# Patient Record
Sex: MALE | Race: WHITE | NOT HISPANIC OR LATINO
[De-identification: names, ages, dates, MRNs, and addresses within clinical notes are randomized per-mention and may not be internally consistent; named-entity substitution may affect disease eponyms.]

---

## 2022-04-15 ENCOUNTER — APPOINTMENT (OUTPATIENT)
Dept: PAIN MANAGEMENT | Facility: CLINIC | Age: 72
End: 2022-04-15
Payer: OTHER MISCELLANEOUS

## 2022-04-15 VITALS — BODY MASS INDEX: 38.41 KG/M2 | HEIGHT: 66 IN | WEIGHT: 239 LBS

## 2022-04-15 PROCEDURE — 99213 OFFICE O/P EST LOW 20 MIN: CPT

## 2022-04-15 PROCEDURE — 99072 ADDL SUPL MATRL&STAF TM PHE: CPT

## 2022-04-15 NOTE — HISTORY OF PRESENT ILLNESS
[Neck] : neck [Lower back] : lower back [Dull/Aching] : dull/aching [Sharp] : sharp [Shooting] : shooting [] : yes [Frequent] : frequent [Household chores] : household chores [Social interactions] : social interactions [Rest] : rest [Meds] : meds [Walking] : walking [Bending forward] : bending forward [Exercising] : exercising [Stairs] : stairs [FreeTextEntry1] : shoulders, knees [de-identified] : lifting

## 2022-06-21 ENCOUNTER — APPOINTMENT (OUTPATIENT)
Dept: ORTHOPEDIC SURGERY | Facility: CLINIC | Age: 72
End: 2022-06-21
Payer: OTHER MISCELLANEOUS

## 2022-06-21 DIAGNOSIS — I63.9 CEREBRAL INFARCTION, UNSPECIFIED: ICD-10-CM

## 2022-06-21 PROCEDURE — 73562 X-RAY EXAM OF KNEE 3: CPT | Mod: 50

## 2022-06-21 PROCEDURE — 99215 OFFICE O/P EST HI 40 MIN: CPT

## 2022-06-21 PROCEDURE — 99072 ADDL SUPL MATRL&STAF TM PHE: CPT

## 2022-06-21 NOTE — DISCUSSION/SUMMARY
[de-identified] : The natural progression of Osteoarthritis was explained to the patient.  We discussed the possible treatment options from conservative to operative.  These included NSAIDS, Glucosamine and Chondrotin sulfate, and Physical Therapy as well different types of injections.  We also discussed that at some point they may progress to needed a TKA.  Information and pamphlets were given.\par \par We discussed my findings and the natural history of their condition.  We talked about the details of the proposed surgery and the recovery.  We discussed the material risks, possible benefits and alternatives to surgery.  The risks include but are not limited to infection, bleeding and possible need for blood transfusion, fracture, bowel blockage, bladder retention or infection, need for reoperation, stiffness and/or limited range of motion, possible damage to nerves and blood vessels, failure of fixation of components, risk of deep vein thromboses and pulmonary embolism, wound healing problems, dislocation, and possible leg length discrepancy.  Although incredibly rare, we also discussed the risks of a cardiac event, stroke and even death during, or following, the surgery.  We discussed the type of implants the patient will be receiving and the type of fixation that will be used, as well as whether a robot or computer navigation aide will be used.  The patient understands they will need medical clearance and will attend a preoperative joint education class.  We also discussed the type of anesthesia they will receive, and the risks associated with hospital or rehab length of stay, obesity, diabetes and smoking.\par \par Progress note completed by Shagufta Jarrett PA-C.\par The documentation recorded by the PA accurately reflects the service I personally performed and the decisions made by me. -Dr. Siddiqui\par

## 2022-06-21 NOTE — IMAGING
[de-identified] : Right Knee: Palpation of the knee is as follows: medial joint line tenderness. Knee Range of Motion is as follows: Flexion: 120 degrees. Gait and function is as follows: mildly antalgic gait. \par \par Left Knee: Inspection of the knee is as follows: mild effusion. Palpation of the knee is as follows: medial joint line tenderness. Knee Range of Motion is as follows: Flexion: 120 degrees. Gait and function is as follows: ambulation with cane.

## 2022-06-21 NOTE — ASSESSMENT
[FreeTextEntry1] : Previous doc:\par Mod/adv OA left knee, prior tib plateau fx. Progression on degen on XR over the psat 3 years. Cortisone inj today and work on weight loss to get BMI < 40.\par 6/21/19: Continue activities as tolerated and HEP. He has to get vertigo under control. Will consider cortisone at next appt.\par 9/13/19: Cont pain in left knee - tolerating it with home exercises. Working on weight loss and vertigo control. Uses cane for balance/vertigo.\par 12/13 knee is stable no sig changes - HEP - uising cane for balance - inj left shoulder today did help him in the past\par 4/22/20:Steroid inj helped in the past but want to wait until this passes but stable rights now - Vertigo is stable with occ episodes - Left shoulder inj helped a lot rarely has pain\par 6/2/20: Mult falls secondary to episodes of vertigo. Not much pain today so will hold on injections. Concerned about his ongoing vertigo causing mult falls and leading to further injury - needs to keep following this with PCP although he has been told it will get progressively worse over time and has no good short term solution. Had neg neuro and ENT eval in the past. Left shoulder repeat bursa inj today. - has been falling a lot due to knee and balance - has rib contusion and I feel he will benefit from lidocaine patches\par 9/4/20: Overall no change - tolerating pain well but has episodes of flareups after falls from vertigo. Discussed inj when pain is severe but for now will cont home exercises.\par 12/18/20: Stable since last visit. Seems do be doing ok with home exercises so will cont this for now and reeval in 3 months.\par 8/23/21: Hematoma throughout right calf - no signs of DVT. Recc cont with ice and elevation and restart PT when able to work on quad strength which was likely cause for his knees giving out.\par 10/1 swelling and ecchimosis resolving but still some knee pain - unchanged and stable - shoulder is stable since the injection - he is currently permanent out of work\par 3/29/22: Has falls every so often due to vertigo. Knees have pain when these episodes occur however today he is tolerating the pain well and declined injections.\par \par 6/21/22: Discussed TKA, r/b/a, and preop/postop periods in detail. LEft knee worse than the rt knee - planning for TKA in the fall - we will plan robotics get Knee CT scan to eval bone loss

## 2022-06-21 NOTE — HISTORY OF PRESENT ILLNESS
[Work related] : work related [Sudden] : sudden [7] : 7 [Dull/Aching] : dull/aching [Intermittent] : intermittent [Meds] : meds [Physical therapy] : physical therapy [Walking] : walking [Exercising] : exercising [de-identified] : 6/21/22: He returns with increasing pain in bilateral knees with increasing feeling of instability in his L knee. Pain is now affecting his ADLs\par \par Previous doc:\par Left knee injury in 2016 sustained lateral tibial plateau fx treated nonsurgically. Prior to that had arthroscopy 2008. Worsening pain over the past 3 years. PT in the past helped a little but had difficulty with this due to vertigo. Cortisone inj in the past helped. CVA 2017 now takes aspirin - weakness only in right pinky finger.\par 6/21/19: Symptoms remain the same. Injection helped temporary. Has good and bad days with knee depending on severity of vertigo.\par 9/13/19: Cont pain in left knee, no change overall.\par 12/13/19: Cont pain in knees but tolerable. Does home exercises. Left shoulder worsening x 3 months which is part of his WC case. Had inj in Feb which helped for several months - MRI in the past with partial cuff tearing. Pain wakes him up at night.\par 4/22/20: he has reji doing min due to COVId he is in some pain but tolerable\par 6/2/20: Mult falls from vertigo recently. Has knee pain only at the time of the fall and then resolves. Min pain today.\par 9/4/20: Had good relief from shoulder inj last time. Mult falls with vertigo, no change overall. Pain is daily but tolerable at this time.\par 12/18/20: No significant change since last time. has been doing home exercises which help.\par 8/23/21: Legs gave out while on the beach, fell and has pain and swelling in right leg. Seen at urgent care last week, had neg XRs.\par 10/1 overall most of the calf pain as swelling has resolved\par 3/29/22: Continued pain in b/l knees. Had another fall last month due to vertigo. [] : no [FreeTextEntry1] : bilateral knees  [FreeTextEntry3] : 01/16/2001 [FreeTextEntry5] : pt reports left>right knee pain. pain is exacerbated today due to exercising yesterday. [FreeTextEntry7] : knees to feet

## 2022-06-22 ENCOUNTER — FORM ENCOUNTER (OUTPATIENT)
Age: 72
End: 2022-06-22

## 2022-06-23 ENCOUNTER — APPOINTMENT (OUTPATIENT)
Dept: CT IMAGING | Facility: CLINIC | Age: 72
End: 2022-06-23
Payer: OTHER MISCELLANEOUS

## 2022-06-23 PROCEDURE — 73700 CT LOWER EXTREMITY W/O DYE: CPT | Mod: LT

## 2022-06-23 PROCEDURE — 99072 ADDL SUPL MATRL&STAF TM PHE: CPT

## 2022-06-23 PROCEDURE — 76376 3D RENDER W/INTRP POSTPROCES: CPT | Mod: LT

## 2022-07-12 ENCOUNTER — RX RENEWAL (OUTPATIENT)
Age: 72
End: 2022-07-12

## 2022-07-14 ENCOUNTER — APPOINTMENT (OUTPATIENT)
Dept: PAIN MANAGEMENT | Facility: CLINIC | Age: 72
End: 2022-07-14

## 2022-07-14 VITALS — HEIGHT: 66 IN | WEIGHT: 243 LBS | BODY MASS INDEX: 39.05 KG/M2

## 2022-07-14 PROCEDURE — 99213 OFFICE O/P EST LOW 20 MIN: CPT

## 2022-07-14 PROCEDURE — 99072 ADDL SUPL MATRL&STAF TM PHE: CPT

## 2022-07-14 NOTE — HISTORY OF PRESENT ILLNESS
[Neck] : neck [Lower back] : lower back [Dull/Aching] : dull/aching [Sharp] : sharp [Shooting] : shooting [Frequent] : frequent [Household chores] : household chores [Social interactions] : social interactions [Rest] : rest [Meds] : meds [Walking] : walking [Bending forward] : bending forward [Exercising] : exercising [Stairs] : stairs [] : yes [FreeTextEntry1] : shoulders, knees [de-identified] : lifting

## 2022-07-14 NOTE — ASSESSMENT
[FreeTextEntry1] : refill meds\par pain is stable, will continue to monitor f.u 3 months \par \par planning knee replacement L with Dr Siddiqui

## 2022-07-14 NOTE — HISTORY OF PRESENT ILLNESS
[Neck] : neck [Lower back] : lower back [Dull/Aching] : dull/aching [Sharp] : sharp [Shooting] : shooting [Frequent] : frequent [Household chores] : household chores [Social interactions] : social interactions [Rest] : rest [Meds] : meds [Walking] : walking [Bending forward] : bending forward [Exercising] : exercising [Stairs] : stairs [] : yes [FreeTextEntry1] : shoulders, knees [de-identified] : lifting

## 2022-07-19 ENCOUNTER — APPOINTMENT (OUTPATIENT)
Dept: ORTHOPEDIC SURGERY | Facility: CLINIC | Age: 72
End: 2022-07-19

## 2022-07-19 VITALS — BODY MASS INDEX: 22.5 KG/M2 | HEIGHT: 66 IN | WEIGHT: 140 LBS

## 2022-07-19 PROCEDURE — 99072 ADDL SUPL MATRL&STAF TM PHE: CPT

## 2022-07-19 PROCEDURE — 99213 OFFICE O/P EST LOW 20 MIN: CPT | Mod: PA

## 2022-07-19 NOTE — DISCUSSION/SUMMARY
[de-identified] : We discussed my findings and the natural history of their condition.  We talked about the details of the proposed surgery and the recovery.  We discussed the material risks, possible benefits and alternatives to surgery.  The risks include but are not limited to infection, bleeding and possible need for blood transfusion, fracture, bowel blockage, bladder retention or infection, need for reoperation, stiffness and/or limited range of motion, possible damage to nerves and blood vessels, failure of fixation of components, risk of deep vein thromboses and pulmonary embolism, wound healing problems, dislocation, and possible leg length discrepancy.  Although incredibly rare, we also discussed the risks of a cardiac event, stroke and even death during, or following, the surgery.  We discussed the type of implants the patient will be receiving and the type of fixation that will be used, as well as whether a robot or computer navigation aide will be used.  The patient understands they will need medical clearance and will attend a preoperative joint education class.  We also discussed the type of anesthesia they will receive, and the risks associated with hospital or rehab length of stay, obesity, diabetes and smoking.\par \par The natural progression of Osteoarthritis was explained to the patient.  We discussed the possible treatment options from conservative to operative.  These included NSAIDS, Glucosamine and Chondrotin sulfate, Physical Therapy and injections.  We also discussed that at some point they may progress to needing a TKA.\par

## 2022-07-19 NOTE — IMAGING
[de-identified] : Right Knee: Palpation of the knee is as follows: medial joint line tenderness. Knee Range of Motion is as follows: Flexion: 120 degrees. Gait and function is as follows: mildly antalgic gait. \par \par Left Knee: Inspection of the knee is as follows: mild effusion. Palpation of the knee is as follows: medial joint line tenderness. Knee Range of Motion is as follows: Flexion: 120 degrees. Gait and function is as follows: ambulation with cane.

## 2022-07-19 NOTE — ASSESSMENT
[FreeTextEntry1] : Previous doc:\par Mod/adv OA left knee, prior tib plateau fx. Progression on degen on XR over the psat 3 years. Cortisone inj today and work on weight loss to get BMI < 40.\par 6/21/19: Continue activities as tolerated and HEP. He has to get vertigo under control. Will consider cortisone at next appt.\par 9/13/19: Cont pain in left knee - tolerating it with home exercises. Working on weight loss and vertigo control. Uses cane for balance/vertigo.\par 12/13 knee is stable no sig changes - HEP - uising cane for balance - inj left shoulder today did help him in the past\par 4/22/20:Steroid inj helped in the past but want to wait until this passes but stable rights now - Vertigo is stable with occ episodes - Left shoulder inj helped a lot rarely has pain\par 6/2/20: Mult falls secondary to episodes of vertigo. Not much pain today so will hold on injections. Concerned about his ongoing vertigo causing mult falls and leading to further injury - needs to keep following this with PCP although he has been told it will get progressively worse over time and has no good short term solution. Had neg neuro and ENT eval in the past. Left shoulder repeat bursa inj today. - has been falling a lot due to knee and balance - has rib contusion and I feel he will benefit from lidocaine patches\par 9/4/20: Overall no change - tolerating pain well but has episodes of flareups after falls from vertigo. Discussed inj when pain is severe but for now will cont home exercises.\par 12/18/20: Stable since last visit. Seems do be doing ok with home exercises so will cont this for now and reeval in 3 months.\par 8/23/21: Hematoma throughout right calf - no signs of DVT. Recc cont with ice and elevation and restart PT when ab/le to work on quad strength which was likely cause for his knees giving out.\par 10/1 swelling and ecchimosis resolving but still some knee pain - unchanged and stable - shoulder is stable since the injection - he is currently permanent out of work\par \par 7/19/22: \par 3/29/22: Has falls every so often due to vertigo. Knees have pain when these episodes occur however today he is tolerating the pain well and declined injections.\par 6/21/22: Discussed TKA, r/b/a, and preop/postop periods in detail. LEft knee worse than the rt knee - planning for TKA in the fall - we will plan robotics get Knee CT scan to eval bone loss   \par \par 7/19/22: Cont pain, proceed with TKA when authorized.  CT was done.

## 2022-07-19 NOTE — HISTORY OF PRESENT ILLNESS
[8] : 8 [4] : 4 [Dull/Aching] : dull/aching [] : yes [de-identified] : 7/19/22: Cont pain, waiting for TKA auth.\par \par Previous doc:\par Left knee injury in 2016 sustained lateral tibial plateau fx treated nonsurgically. Prior to that had arthroscopy 2008. Worsening pain over the past 3 years. PT in the past helped a little but had difficulty with this due to vertigo. Cortisone inj in the past helped. CVA 2017 now takes aspirin - weakness only in right pinky finger.\par 6/21/19: Symptoms remain the same. Injection helped temporary. Has good and bad days with knee depending on severity of vertigo.\par 9/13/19: Cont pain in left knee, no change overall.\par 12/13/19: Cont pain in knees but tolerable. Does home exercises. Left shoulder worsening x 3 months which is part of his WC case. Had inj in Feb which helped for several months - MRI in the past with partial cuff tearing. Pain wakes him up at night.\par 4/22/20: he has reji doing min due to COVId he is in some pain but tolerable\par 6/2/20: Mult falls from vertigo recently. Has knee pain only at the time of the fall and then resolves. Min pain today.\par 9/4/20: Had good relief from shoulder inj last time. Mult falls with vertigo, no change overall. Pain is daily but tolerable at this time.\par 12/18/20: No significant change since last time. has been doing home exercises which help.\par 8/23/21: Legs gave out while on the beach, fell and has pain and swelling in right leg. Seen at urgent care last week, had neg XRs.\par 10/1 overall most of the calf pain as swelling has resolved\par 3/29/22: Continued pain in b/l knees. Had another fall last month due to vertigo.\par 6/21/22: He returns with increasing pain in bilateral knees with increasing feeling of instability in his L knee. Pain is now affecting his ADLs

## 2022-07-19 NOTE — HISTORY OF PRESENT ILLNESS
[Neck] : neck [Lower back] : lower back [Dull/Aching] : dull/aching [Sharp] : sharp [Shooting] : shooting [Frequent] : frequent [Household chores] : household chores [Social interactions] : social interactions [Rest] : rest [Meds] : meds [Walking] : walking [Bending forward] : bending forward [Exercising] : exercising [] : yes [de-identified] : lifting

## 2022-08-15 ENCOUNTER — RX RENEWAL (OUTPATIENT)
Age: 72
End: 2022-08-15

## 2022-10-11 ENCOUNTER — RX RENEWAL (OUTPATIENT)
Age: 72
End: 2022-10-11

## 2022-10-20 ENCOUNTER — APPOINTMENT (OUTPATIENT)
Dept: PAIN MANAGEMENT | Facility: CLINIC | Age: 72
End: 2022-10-20
Payer: OTHER MISCELLANEOUS

## 2022-10-20 VITALS — BODY MASS INDEX: 38.25 KG/M2 | HEIGHT: 66 IN | WEIGHT: 238 LBS

## 2022-10-20 PROCEDURE — 99213 OFFICE O/P EST LOW 20 MIN: CPT

## 2022-10-20 PROCEDURE — 99072 ADDL SUPL MATRL&STAF TM PHE: CPT

## 2022-10-20 NOTE — HISTORY OF PRESENT ILLNESS
[Lower back] : lower back [Dull/Aching] : dull/aching [Sharp] : sharp [Shooting] : shooting [Frequent] : frequent [Household chores] : household chores [Social interactions] : social interactions [Rest] : rest [Meds] : meds [Walking] : walking [Bending forward] : bending forward [Exercising] : exercising [Stairs] : stairs [] : yes [Neck] : neck [Work related] : work related [8] : 8 [7] : 7 [FreeTextEntry1] : shoulders, knees [FreeTextEntry3] : 1/16/01 [FreeTextEntry7] : left ankle/knees [de-identified] : lifting

## 2022-10-25 ENCOUNTER — APPOINTMENT (OUTPATIENT)
Dept: ORTHOPEDIC SURGERY | Facility: CLINIC | Age: 72
End: 2022-10-25

## 2022-10-25 VITALS — WEIGHT: 238 LBS | HEIGHT: 66 IN | BODY MASS INDEX: 38.25 KG/M2

## 2022-10-25 PROCEDURE — 99072 ADDL SUPL MATRL&STAF TM PHE: CPT

## 2022-10-25 PROCEDURE — 99213 OFFICE O/P EST LOW 20 MIN: CPT

## 2022-10-25 NOTE — IMAGING
[de-identified] : Right Knee: Palpation of the knee is as follows: medial joint line tenderness. Knee Range of Motion is as follows: Flexion: 120 degrees. Gait and function is as follows: mildly antalgic gait. \par \par Left Knee: Inspection of the knee is as follows: mild effusion. Palpation of the knee is as follows: medial joint line tenderness. Knee Range of Motion is as follows: Flexion: 120 degrees. Gait and function is as follows: ambulation with cane.

## 2022-10-25 NOTE — ASSESSMENT
[FreeTextEntry1] : Previous doc:\par Mod/adv OA left knee, prior tib plateau fx. Progression on degen on XR over the psat 3 years. Cortisone inj today and work on weight loss to get BMI < 40.\par 6/21/19: Continue activities as tolerated and HEP. He has to get vertigo under control. Will consider cortisone at next appt.\par 9/13/19: Cont pain in left knee - tolerating it with home exercises. Working on weight loss and vertigo control. Uses cane for balance/vertigo.\par 12/13 knee is stable no sig changes - HEP - uising cane for balance - inj left shoulder today did help him in the past\par 4/22/20:Steroid inj helped in the past but want to wait until this passes but stable rights now - Vertigo is stable with occ episodes - Left shoulder inj helped a lot rarely has pain\par 6/2/20: Mult falls secondary to episodes of vertigo. Not much pain today so will hold on injections. Concerned about his ongoing vertigo causing mult falls and leading to further injury - needs to keep following this with PCP although he has been told it will get progressively worse over time and has no good short term solution. Had neg neuro and ENT eval in the past. Left shoulder repeat bursa inj today. - has been falling a lot due to knee and balance - has rib contusion and I feel he will benefit from lidocaine patches\par 9/4/20: Overall no change - tolerating pain well but has episodes of flareups after falls from vertigo. Discussed inj when pain is severe but for now will cont home exercises.\par 12/18/20: Stable since last visit. Seems do be doing ok with home exercises so will cont this for now and reeval in 3 months.\par 8/23/21: Hematoma throughout right calf - no signs of DVT. Recc cont with ice and elevation and restart PT when ab/le to work on quad strength which was likely cause for his knees giving out.\par 10/1 swelling and ecchimosis resolving but still some knee pain - unchanged and stable - shoulder is stable since the injection - he is currently permanent out of work\par 7/19/22: \par 3/29/22: Has falls every so often due to vertigo. Knees have pain when these episodes occur however today he is tolerating the pain well and declined injections.\par 6/21/22: Discussed TKA, r/b/a, and preop/postop periods in detail. LEft knee worse than the rt knee - planning for TKA in the fall - we will plan robotics get Knee CT scan to eval bone loss   \par 7/19/22: Cont pain, proceed with TKA when authorized.  CT was done.\par \par 10/25/22: still awaiting WC auth as pain is worsening as well as worsening right knee pain. FU in 4 weeks. plan to speak with  and EC department

## 2022-10-25 NOTE — DISCUSSION/SUMMARY
[de-identified] : We discussed my findings and the natural history of their condition.  We talked about the details of the proposed surgery and the recovery.  We discussed the material risks, possible benefits and alternatives to surgery.  The risks include but are not limited to infection, bleeding and possible need for blood transfusion, fracture, bowel blockage, bladder retention or infection, need for reoperation, stiffness and/or limited range of motion, possible damage to nerves and blood vessels, failure of fixation of components, risk of deep vein thromboses and pulmonary embolism, wound healing problems, dislocation, and possible leg length discrepancy.  Although incredibly rare, we also discussed the risks of a cardiac event, stroke and even death during, or following, the surgery.  We discussed the type of implants the patient will be receiving and the type of fixation that will be used, as well as whether a robot or computer navigation aide will be used.  The patient understands they will need medical clearance and will attend a preoperative joint education class.  We also discussed the type of anesthesia they will receive, and the risks associated with hospital or rehab length of stay, obesity, diabetes and smoking.\par \par The natural progression of Osteoarthritis was explained to the patient.  We discussed the possible treatment options from conservative to operative.  These included NSAIDS, Glucosamine and Chondrotin sulfate, Physical Therapy and injections.  We also discussed that at some point they may progress to needing a TKA.\par

## 2022-10-25 NOTE — HISTORY OF PRESENT ILLNESS
[Work related] : work related [8] : 8 [6] : 6 [Dull/Aching] : dull/aching [Constant] : constant [Household chores] : household chores [Leisure] : leisure [Meds] : meds [Ice] : ice [Standing] : standing [Walking] : walking [de-identified] : 10/25/22: continued pain in knees - is waiting for a surgery auth\par \par Previous doc:\par Left knee injury in 2016 sustained lateral tibial plateau fx treated nonsurgically. Prior to that had arthroscopy 2008. Worsening pain over the past 3 years. PT in the past helped a little but had difficulty with this due to vertigo. Cortisone inj in the past helped. CVA 2017 now takes aspirin - weakness only in right pinky finger.\par 6/21/19: Symptoms remain the same. Injection helped temporary. Has good and bad days with knee depending on severity of vertigo.\par 9/13/19: Cont pain in left knee, no change overall.\par 12/13/19: Cont pain in knees but tolerable. Does home exercises. Left shoulder worsening x 3 months which is part of his WC case. Had inj in Feb which helped for several months - MRI in the past with partial cuff tearing. Pain wakes him up at night.\par 4/22/20: he has reji doing min due to COVId he is in some pain but tolerable\par 6/2/20: Mult falls from vertigo recently. Has knee pain only at the time of the fall and then resolves. Min pain today.\par 9/4/20: Had good relief from shoulder inj last time. Mult falls with vertigo, no change overall. Pain is daily but tolerable at this time.\par 12/18/20: No significant change since last time. has been doing home exercises which help.\par 8/23/21: Legs gave out while on the beach, fell and has pain and swelling in right leg. Seen at urgent care last week, had neg XRs.\par 10/1 overall most of the calf pain as swelling has resolved\par 3/29/22: Continued pain in b/l knees. Had another fall last month due to vertigo.\par 6/21/22: He returns with increasing pain in bilateral knees with increasing feeling of instability in his L knee. Pain is now affecting his ADLs\par 7/19/22: Cont pain, waiting for TKA auth. [] : no [FreeTextEntry1] : left knee [FreeTextEntry3] : 01/16/01 [FreeTextEntry5] : Pt is here to follow up on the work related injury to the left knee. Pt states pain has worsened since last visit. Pt states physical therapy assisted pain, but still feels extreme pain. Pt states going down an incline, or bending over is when pain is most prevalent. Pt states he does leg exercises at home.

## 2022-11-07 ENCOUNTER — RX RENEWAL (OUTPATIENT)
Age: 72
End: 2022-11-07

## 2022-11-22 ENCOUNTER — APPOINTMENT (OUTPATIENT)
Dept: ORTHOPEDIC SURGERY | Facility: CLINIC | Age: 72
End: 2022-11-22

## 2022-11-22 VITALS — WEIGHT: 238 LBS | HEIGHT: 66 IN | BODY MASS INDEX: 38.25 KG/M2

## 2022-11-22 PROCEDURE — 99213 OFFICE O/P EST LOW 20 MIN: CPT

## 2022-11-22 PROCEDURE — 99072 ADDL SUPL MATRL&STAF TM PHE: CPT

## 2022-11-22 NOTE — ASSESSMENT
[FreeTextEntry1] : Previous doc:\par Mod/adv OA left knee, prior tib plateau fx. Progression on degen on XR over the psat 3 years. Cortisone inj today and work on weight loss to get BMI < 40.\par 6/21/19: Continue activities as tolerated and HEP. He has to get vertigo under control. Will consider cortisone at next appt.\par 9/13/19: Cont pain in left knee - tolerating it with home exercises. Working on weight loss and vertigo control. Uses cane for balance/vertigo.\par 12/13 knee is stable no sig changes - HEP - uising cane for balance - inj left shoulder today did help him in the past\par 4/22/20:Steroid inj helped in the past but want to wait until this passes but stable rights now - Vertigo is stable with occ episodes - Left shoulder inj helped a lot rarely has pain\par 6/2/20: Mult falls secondary to episodes of vertigo. Not much pain today so will hold on injections. Concerned about his ongoing vertigo causing mult falls and leading to further injury - needs to keep following this with PCP although he has been told it will get progressively worse over time and has no good short term solution. Had neg neuro and ENT eval in the past. Left shoulder repeat bursa inj today. - has been falling a lot due to knee and balance - has rib contusion and I feel he will benefit from lidocaine patches\par 9/4/20: Overall no change - tolerating pain well but has episodes of flareups after falls from vertigo. Discussed inj when pain is severe but for now will cont home exercises.\par 12/18/20: Stable since last visit. Seems do be doing ok with home exercises so will cont this for now and reeval in 3 months.\par 8/23/21: Hematoma throughout right calf - no signs of DVT. Recc cont with ice and elevation and restart PT when ab/le to work on quad strength which was likely cause for his knees giving out.\par 10/1 swelling and ecchimosis resolving but still some knee pain - unchanged and stable - shoulder is stable since the injection - he is currently permanent out of work\par 7/19/22: \par 3/29/22: Has falls every so often due to vertigo. Knees have pain when these episodes occur however today he is tolerating the pain well and declined injections.\par 6/21/22: Discussed TKA, r/b/a, and preop/postop periods in detail. LEft knee worse than the rt knee - planning for TKA in the fall - we will plan robotics get Knee CT scan to eval bone loss   \par 7/19/22: Cont pain, proceed with TKA when authorized.  CT was done.\par 10/25/22: still awaiting WC auth as pain is worsening as well as worsening right knee pain. FU in 4 weeks. plan to speak with  and EC department \par \par 11/22/22: Worsening pain in left knee. He is still pending surgical auth. again review surgery and post op coarse

## 2022-11-22 NOTE — DISCUSSION/SUMMARY
[de-identified] : The natural progression of Osteoarthritis was explained to the patient.  We discussed the possible treatment options from conservative to operative.  These included NSAIDS, Glucosamine and Chondrotin sulfate, and Physical Therapy as well different types of injections.  We also discussed that at some point they may progress to needed a TKA.  Information and pamphlets were given when appropriate.\par \par Patient Complains of pain in Knee with a level that often reaches greater than a 8/10. The Pain has been progressively worsening of his/her treatment coarse. The pain has interfered with their ADLs and worsens with weight bearing. On exam they often have episodes of swelling/effusion with limited ROM. Pain worsens with ROM passive and active and I can palpate crepitus.\par  X rays were reviewed with the patient and they show joint space narrowing, subchondral sclerosis, osteophyte formation, and subchondral cysts.\par  After a period of more than 12 weeks physical therapy or exercise program done with me or another treating physician they have continued pain. The patient has failed a trial of NSAID medication or pain relieves if they were unable to tolerate NSAID medications as well as a series of injection, steroid or Hyaluronic Acid. After a long discussion with the patient both the patient and I have decided we have exhausted all forms of less radical treatments and they would like to proceed with Total Knee Replacement\par \par We discussed my findings and the natural history of their condition.  We talked about the details of the proposed surgery and the recovery.  We discussed the material risks, possible benefits and alternatives to surgery.  The risks include but are not limited to infection, bleeding and possible need for blood transfusion, fracture, bowel blockage, bladder retention or infection, need for reoperation, stiffness and/or limited range of motion, possible damage to nerves and blood vessels, failure of fixation of components, risk of deep vein thromboses and pulmonary embolism, wound healing problems, dislocation, and possible leg length discrepancy.  Although incredibly rare, we also discussed the risks of a cardiac event, stroke and even death during, or following, the surgery.  We discussed the type of implants the patient will be receiving and the type of fixation that will be used, as well as whether a robot or computer navigation aide will be used.  The patient understands they will need medical clearance and will attend a preoperative joint education class.  We also discussed the type of anesthesia they will receive, and the risks associated with hospital or rehab length of stay, obesity, diabetes and smoking.\par \par

## 2022-11-22 NOTE — HISTORY OF PRESENT ILLNESS
[Work related] : work related [8] : 8 [5] : 5 [Dull/Aching] : dull/aching [Radiating] : radiating [] : yes [de-identified] : 11/22/22: Worsening pain in knee. He is still pending surgical auth.   \par \par Previous doc:\par Left knee injury in 2016 sustained lateral tibial plateau fx treated nonsurgically. Prior to that had arthroscopy 2008. Worsening pain over the past 3 years. PT in the past helped a little but had difficulty with this due to vertigo. Cortisone inj in the past helped. CVA 2017 now takes aspirin - weakness only in right pinky finger.\par 6/21/19: Symptoms remain the same. Injection helped temporary. Has good and bad days with knee depending on severity of vertigo.\par 9/13/19: Cont pain in left knee, no change overall.\par 12/13/19: Cont pain in knees but tolerable. Does home exercises. Left shoulder worsening x 3 months which is part of his WC case. Had inj in Feb which helped for several months - MRI in the past with partial cuff tearing. Pain wakes him up at night.\par 4/22/20: he has reji doing min due to COVId he is in some pain but tolerable\par 6/2/20: Mult falls from vertigo recently. Has knee pain only at the time of the fall and then resolves. Min pain today.\par 9/4/20: Had good relief from shoulder inj last time. Mult falls with vertigo, no change overall. Pain is daily but tolerable at this time.\par 12/18/20: No significant change since last time. has been doing home exercises which help.\par 8/23/21: Legs gave out while [place] the beach, fell and has pain and swelling in right leg. Seen at urgent care last week, had neg XRs.\par 10/1 overall most of the calf pain as swelling has resolved\par 3/29/22: Continued pain in b/l knees. Had another fall last month due to vertigo.\par 6/21/22: He returns with increasing pain in bilateral knees with increasing feeling of instability in his L knee. Pain is now affecting his ADLs\par 7/19/22: Cont pain, waiting for TKA auth.\par 10/25/22: continued pain in knees - is waiting for a surgery auth [FreeTextEntry3] : 1/16/2001 [FreeTextEntry5] : pain getting worse

## 2022-11-22 NOTE — IMAGING
[de-identified] : Right Knee: Palpation of the knee is as follows: medial joint line tenderness. Knee Range of Motion is as follows: Flexion: 120 degrees. Gait and function is as follows: mildly antalgic gait. \par \par Left Knee: Inspection of the knee is as follows: mild effusion. Palpation of the knee is as follows: medial joint line tenderness. Knee Range of Motion is as follows: Flexion: 120 degrees. Gait and function is as follows: ambulation with cane.

## 2022-12-05 ENCOUNTER — RX RENEWAL (OUTPATIENT)
Age: 72
End: 2022-12-05

## 2023-01-02 ENCOUNTER — RX RENEWAL (OUTPATIENT)
Age: 73
End: 2023-01-02

## 2023-01-09 ENCOUNTER — FORM ENCOUNTER (OUTPATIENT)
Age: 73
End: 2023-01-09

## 2023-01-12 ENCOUNTER — APPOINTMENT (OUTPATIENT)
Dept: PAIN MANAGEMENT | Facility: CLINIC | Age: 73
End: 2023-01-12
Payer: OTHER MISCELLANEOUS

## 2023-01-12 VITALS — BODY MASS INDEX: 37.93 KG/M2 | HEIGHT: 66 IN | WEIGHT: 236 LBS

## 2023-01-12 PROCEDURE — 99072 ADDL SUPL MATRL&STAF TM PHE: CPT

## 2023-01-12 PROCEDURE — 99214 OFFICE O/P EST MOD 30 MIN: CPT

## 2023-01-12 NOTE — HISTORY OF PRESENT ILLNESS
[Neck] : neck [Lower back] : lower back [Work related] : work related [8] : 8 [7] : 7 [Dull/Aching] : dull/aching [Sharp] : sharp [Shooting] : shooting [Frequent] : frequent [Household chores] : household chores [Social interactions] : social interactions [Rest] : rest [Meds] : meds [Walking] : walking [Bending forward] : bending forward [Exercising] : exercising [Stairs] : stairs [] : yes [FreeTextEntry1] : shoulders, knees [FreeTextEntry3] : 1/16/01 [FreeTextEntry7] : left ankle/knees [de-identified] : lifting

## 2023-01-18 ENCOUNTER — RX RENEWAL (OUTPATIENT)
Age: 73
End: 2023-01-18

## 2023-01-26 ENCOUNTER — OUTPATIENT (OUTPATIENT)
Dept: OUTPATIENT SERVICES | Facility: HOSPITAL | Age: 73
LOS: 1 days | Discharge: HOME | End: 2023-01-26
Payer: OTHER MISCELLANEOUS

## 2023-01-26 VITALS
HEART RATE: 60 BPM | TEMPERATURE: 98 F | RESPIRATION RATE: 17 BRPM | SYSTOLIC BLOOD PRESSURE: 126 MMHG | WEIGHT: 238.32 LBS | DIASTOLIC BLOOD PRESSURE: 85 MMHG | OXYGEN SATURATION: 97 %

## 2023-01-26 DIAGNOSIS — M17.12 UNILATERAL PRIMARY OSTEOARTHRITIS, LEFT KNEE: ICD-10-CM

## 2023-01-26 DIAGNOSIS — Z01.818 ENCOUNTER FOR OTHER PREPROCEDURAL EXAMINATION: ICD-10-CM

## 2023-01-26 DIAGNOSIS — H40.9 UNSPECIFIED GLAUCOMA: ICD-10-CM

## 2023-01-26 DIAGNOSIS — E78.5 HYPERLIPIDEMIA, UNSPECIFIED: ICD-10-CM

## 2023-01-26 DIAGNOSIS — F41.9 ANXIETY DISORDER, UNSPECIFIED: ICD-10-CM

## 2023-01-26 DIAGNOSIS — Z01.812 ENCOUNTER FOR PREPROCEDURAL LABORATORY EXAMINATION: ICD-10-CM

## 2023-01-26 DIAGNOSIS — Z98.890 OTHER SPECIFIED POSTPROCEDURAL STATES: Chronic | ICD-10-CM

## 2023-01-26 LAB
A1C WITH ESTIMATED AVERAGE GLUCOSE RESULT: 5.6 % — SIGNIFICANT CHANGE UP (ref 4–5.6)
ALBUMIN SERPL ELPH-MCNC: 3.7 G/DL — SIGNIFICANT CHANGE UP (ref 3.3–5)
ALP SERPL-CCNC: 66 U/L — SIGNIFICANT CHANGE UP (ref 40–120)
ALT FLD-CCNC: 29 U/L — SIGNIFICANT CHANGE UP (ref 12–78)
ANION GAP SERPL CALC-SCNC: 6 MMOL/L — SIGNIFICANT CHANGE UP (ref 5–17)
APTT BLD: 30.4 SEC — SIGNIFICANT CHANGE UP (ref 27.5–35.5)
AST SERPL-CCNC: 20 U/L — SIGNIFICANT CHANGE UP (ref 15–37)
BASOPHILS # BLD AUTO: 0.02 K/UL — SIGNIFICANT CHANGE UP (ref 0–0.2)
BASOPHILS NFR BLD AUTO: 0.4 % — SIGNIFICANT CHANGE UP (ref 0–2)
BILIRUB SERPL-MCNC: 0.4 MG/DL — SIGNIFICANT CHANGE UP (ref 0.2–1.2)
BUN SERPL-MCNC: 18 MG/DL — SIGNIFICANT CHANGE UP (ref 7–23)
CALCIUM SERPL-MCNC: 9.3 MG/DL — SIGNIFICANT CHANGE UP (ref 8.5–10.1)
CHLORIDE SERPL-SCNC: 109 MMOL/L — HIGH (ref 96–108)
CO2 SERPL-SCNC: 28 MMOL/L — SIGNIFICANT CHANGE UP (ref 22–31)
CREAT SERPL-MCNC: 0.99 MG/DL — SIGNIFICANT CHANGE UP (ref 0.5–1.3)
EGFR: 81 ML/MIN/1.73M2 — SIGNIFICANT CHANGE UP
EOSINOPHIL # BLD AUTO: 0.15 K/UL — SIGNIFICANT CHANGE UP (ref 0–0.5)
EOSINOPHIL NFR BLD AUTO: 3.3 % — SIGNIFICANT CHANGE UP (ref 0–6)
ESTIMATED AVERAGE GLUCOSE: 114 MG/DL — SIGNIFICANT CHANGE UP (ref 68–114)
GLUCOSE SERPL-MCNC: 101 MG/DL — HIGH (ref 70–99)
HCT VFR BLD CALC: 38.7 % — LOW (ref 39–50)
HGB BLD-MCNC: 13.1 G/DL — SIGNIFICANT CHANGE UP (ref 13–17)
IMM GRANULOCYTES NFR BLD AUTO: 0.2 % — SIGNIFICANT CHANGE UP (ref 0–0.9)
INR BLD: 0.97 RATIO — SIGNIFICANT CHANGE UP (ref 0.88–1.16)
LYMPHOCYTES # BLD AUTO: 1.93 K/UL — SIGNIFICANT CHANGE UP (ref 1–3.3)
LYMPHOCYTES # BLD AUTO: 41.9 % — SIGNIFICANT CHANGE UP (ref 13–44)
MCHC RBC-ENTMCNC: 33.4 PG — SIGNIFICANT CHANGE UP (ref 27–34)
MCHC RBC-ENTMCNC: 33.9 G/DL — SIGNIFICANT CHANGE UP (ref 32–36)
MCV RBC AUTO: 98.7 FL — SIGNIFICANT CHANGE UP (ref 80–100)
MONOCYTES # BLD AUTO: 0.45 K/UL — SIGNIFICANT CHANGE UP (ref 0–0.9)
MONOCYTES NFR BLD AUTO: 9.8 % — SIGNIFICANT CHANGE UP (ref 2–14)
MRSA PCR RESULT.: SIGNIFICANT CHANGE UP
NEUTROPHILS # BLD AUTO: 2.05 K/UL — SIGNIFICANT CHANGE UP (ref 1.8–7.4)
NEUTROPHILS NFR BLD AUTO: 44.4 % — SIGNIFICANT CHANGE UP (ref 43–77)
NRBC # BLD: 0 /100 WBCS — SIGNIFICANT CHANGE UP (ref 0–0)
PLATELET # BLD AUTO: 193 K/UL — SIGNIFICANT CHANGE UP (ref 150–400)
POTASSIUM SERPL-MCNC: 4.1 MMOL/L — SIGNIFICANT CHANGE UP (ref 3.5–5.3)
POTASSIUM SERPL-SCNC: 4.1 MMOL/L — SIGNIFICANT CHANGE UP (ref 3.5–5.3)
PROT SERPL-MCNC: 7.3 GM/DL — SIGNIFICANT CHANGE UP (ref 6–8.3)
PROTHROM AB SERPL-ACNC: 11.5 SEC — SIGNIFICANT CHANGE UP (ref 10.5–13.4)
RBC # BLD: 3.92 M/UL — LOW (ref 4.2–5.8)
RBC # FLD: 14.2 % — SIGNIFICANT CHANGE UP (ref 10.3–14.5)
S AUREUS DNA NOSE QL NAA+PROBE: DETECTED
SODIUM SERPL-SCNC: 143 MMOL/L — SIGNIFICANT CHANGE UP (ref 135–145)
VIT D25+D1,25 OH+D1,25 PNL SERPL-MCNC: 33 PG/ML — SIGNIFICANT CHANGE UP (ref 19.9–79.3)
WBC # BLD: 4.61 K/UL — SIGNIFICANT CHANGE UP (ref 3.8–10.5)
WBC # FLD AUTO: 4.61 K/UL — SIGNIFICANT CHANGE UP (ref 3.8–10.5)

## 2023-01-26 PROCEDURE — 93010 ELECTROCARDIOGRAM REPORT: CPT

## 2023-01-26 NOTE — OCCUPATIONAL THERAPY INITIAL EVALUATION ADULT - PERTINENT HX OF CURRENT PROBLEM, REHAB EVAL
L knee OA which impacts pts ability to perform functional tasks/transfers and mobility. Pt is scheduled for L TKR on 2/9/23.

## 2023-01-26 NOTE — H&P PST ADULT - PROBLEM SELECTOR PLAN 5
Assessment and Plan: labs - cbc,pt/ptt,bmp,t&s,nose cx,ekg  M/C required and neurologist     preop 3 day hibiclens instruction reviewed and given .instructed on if  nose cx positive use mupuricin 5 days and checklist given  take routine meds DOS with sips of water. avoid NSAID and OTC supplements. verbalized understanding  information on proper nutrition , increase protein and better food choices provided in packet  ensure clear  patient instructed on having covid19 swab 3-5 days prior to surgery  anesthesiologist to review pst labs, ekg, medical clearances and optimization for surgery

## 2023-01-26 NOTE — H&P PST ADULT - NSANTHOSAYNRD_GEN_A_CORE
No. ELINOR screening performed.  STOP BANG Legend: 0-2 = LOW Risk; 3-4 = INTERMEDIATE Risk; 5-8 = HIGH Risk

## 2023-01-26 NOTE — H&P PST ADULT - HISTORY OF PRESENT ILLNESS
73 yo male , pmh- hld, cva in 2020 - no residual ,glaucoma, depression and anxiety - ( secondary to poisoning patient states years ago from work hazard )  c/o left knee pain 2/2 osteoarthritis - scheduled for left  knee arthroplasty  goal: to walk without pain   not vaccinataed for covid19   denies recent travels in the past 30 days. No fever, SOB, cough, flu like symptoms or body rash- covid screen

## 2023-01-26 NOTE — OCCUPATIONAL THERAPY INITIAL EVALUATION ADULT - ADDITIONAL COMMENTS
Pt lives with spouse (Who can assist post op) in a private house with 3 steps to enter with a L handrail. Once inside, the pt bedroom and bathroom is on that floor when entering. The pt bathroom has a tub/shower combination, fixed/retractable shower head, standard toilet seat and no grab bars. The pt reports that a 3/1 commode can fit over the toilet at home. The pt ambulates with no device unless in a lot of pain in which the pt will use a cane. The pt does not own any other ambulation device. The pt daily pain is a 4/10 at rest and a 8-10/10 with movements. The pt manages the pain with rest, gabapentin and Asprin. The pt has no recent outpatient PT, hx of falls and has buckling of the knees. The pt wears glasses all the time, R handed, drives and has no hearing impairments.

## 2023-01-26 NOTE — H&P PST ADULT - ASSESSMENT
left knee osteoarthritis  CAPRINI SCORE    AGE RELATED RISK FACTORS                                                       MOBILITY RELATED FACTORS  [ ] Age 41-60 years                                            (1 Point)                  [ ] Bed rest                                                        (1 Point)  [x ] Age: 61-74 years                                           (2 Points)                [ ] Plaster cast                                                   (2 Points)  [ ] Age= 75 years                                              (3 Points)                 [ ] Bed bound for more than 72 hours                   (2 Points)    DISEASE RELATED RISK FACTORS                                               GENDER SPECIFIC FACTORS  [ ] Edema in the lower extremities                       (1 Point)                  [ ] Pregnancy                                                     (1 Point)  [ ] Varicose veins                                               (1 Point)                  [ ] Post-partum < 6 weeks                                   (1 Point)             x[ ] BMI > 25 Kg/m2                                            (1 Point)                  [ ] Hormonal therapy  or oral contraception            (1 Point)                 [ ] Sepsis (in the previous month)                        (1 Point)                  [ ] History of pregnancy complications  [ ] Pneumonia or serious lung disease                                               [ ] Unexplained or recurrent                       (1 Point)           (in the previous month)                               (1 Point)  [ ] Abnormal pulmonary function test                     (1 Point)                 SURGERY RELATED RISK FACTORS  [ ] Acute myocardial infarction                              (1 Point)                 [ ]  Section                                            (1 Point)  [ ] Congestive heart failure (in the previous month)  (1 Point)                 [ ] Minor surgery                                                 (1 Point)   [ ] Inflammatory bowel disease                             (1 Point)                 [ ] Arthroscopic surgery                                        (2 Points)  [ ] Central venous access                                    (2 Points)                [ ] General surgery lasting more than 45 minutes   (2 Points)       [ ] Stroke (in the previous month)                          (5 Points)               [ x] Elective arthroplasty                                        (5 Points)                                                                                                                                               HEMATOLOGY RELATED FACTORS                                                 TRAUMA RELATED RISK FACTORS  [ ] Prior episodes of VTE                                     (3 Points)                 [ ] Fracture of the hip, pelvis, or leg                       (5 Points)  [ ] Positive family history for VTE                         (3 Points)                 [ ] Acute spinal cord injury (in the previous month)  (5 Points)  [ ] Prothrombin 12583 A                                      (3 Points)                 [ ] Paralysis  (less than 1 month)                          (5 Points)  [ ] Factor V Leiden                                             (3 Points)                 [ ] Multiple Trauma within 1 month                         (5 Points)  [ ] Lupus anticoagulants                                     (3 Points)                                                           [ ] Anticardiolipin antibodies                                (3 Points)                                                       [ ] High homocysteine in the blood                      (3 Points)                                             [ ] Other congenital or acquired thrombophilia       (3 Points)                                                [ ] Heparin induced thrombocytopenia                  (3 Points)                                          Total Score [   8       ]   Caprini Score 0-2: Low risk, No VTE Prophylaxis required for most patient's, encourage ambulation  Caprini Score 3-6: At Risk, Pharmacologic VTE prophylaxis is indicated for most patients ( in the absence of a contraindication)  Caprini Score Greater than or = 7: High Risk , pharmacologic VTE is indicated for most patients ( in the absence of a contraindication)    Caprini score indicates that the patient is high risk for VTE event ( score 6 or greater). Surgical patient's in this group will benefit from both pharmacologic prophylaxis and intermittent compression devices . Surgical team will determine the balance between VTE  risk and bleeding risk and other clinical considerations

## 2023-01-26 NOTE — H&P PST ADULT - NSICDXPASTMEDICALHX_GEN_ALL_CORE_FT
PAST MEDICAL HISTORY:  Anxiety and depression     CVA (cerebrovascular accident)     History of glaucoma     HLD (hyperlipidemia)     Vertigo

## 2023-01-27 RX ORDER — MUPIROCIN 20 MG/G
1 OINTMENT TOPICAL
Qty: 10 | Refills: 0
Start: 2023-01-27 | End: 2023-01-31

## 2023-01-30 RX ORDER — SODIUM CHLORIDE 9 MG/ML
3 INJECTION INTRAMUSCULAR; INTRAVENOUS; SUBCUTANEOUS EVERY 8 HOURS
Refills: 0 | Status: DISCONTINUED | OUTPATIENT
Start: 2023-02-15 | End: 2023-02-16

## 2023-02-08 ENCOUNTER — FORM ENCOUNTER (OUTPATIENT)
Age: 73
End: 2023-02-08

## 2023-02-13 ENCOUNTER — LABORATORY RESULT (OUTPATIENT)
Age: 73
End: 2023-02-13

## 2023-02-14 ENCOUNTER — TRANSCRIPTION ENCOUNTER (OUTPATIENT)
Age: 73
End: 2023-02-14

## 2023-02-15 ENCOUNTER — INPATIENT (INPATIENT)
Facility: HOSPITAL | Age: 73
LOS: 0 days | Discharge: HOME | End: 2023-02-16
Attending: ORTHOPAEDIC SURGERY | Admitting: ORTHOPAEDIC SURGERY
Payer: OTHER MISCELLANEOUS

## 2023-02-15 ENCOUNTER — APPOINTMENT (OUTPATIENT)
Dept: ORTHOPEDIC SURGERY | Facility: HOSPITAL | Age: 73
End: 2023-02-15
Payer: OTHER MISCELLANEOUS

## 2023-02-15 ENCOUNTER — TRANSCRIPTION ENCOUNTER (OUTPATIENT)
Age: 73
End: 2023-02-15

## 2023-02-15 VITALS
RESPIRATION RATE: 17 BRPM | HEART RATE: 54 BPM | DIASTOLIC BLOOD PRESSURE: 73 MMHG | OXYGEN SATURATION: 96 % | WEIGHT: 233.03 LBS | HEIGHT: 66 IN | TEMPERATURE: 98 F | SYSTOLIC BLOOD PRESSURE: 156 MMHG

## 2023-02-15 DIAGNOSIS — Z98.890 OTHER SPECIFIED POSTPROCEDURAL STATES: Chronic | ICD-10-CM

## 2023-02-15 LAB
ANION GAP SERPL CALC-SCNC: 4 MMOL/L — LOW (ref 5–17)
BUN SERPL-MCNC: 14 MG/DL — SIGNIFICANT CHANGE UP (ref 7–23)
CALCIUM SERPL-MCNC: 8.6 MG/DL — SIGNIFICANT CHANGE UP (ref 8.5–10.1)
CHLORIDE SERPL-SCNC: 109 MMOL/L — HIGH (ref 96–108)
CO2 SERPL-SCNC: 29 MMOL/L — SIGNIFICANT CHANGE UP (ref 22–31)
CREAT SERPL-MCNC: 1.04 MG/DL — SIGNIFICANT CHANGE UP (ref 0.5–1.3)
EGFR: 76 ML/MIN/1.73M2 — SIGNIFICANT CHANGE UP
GLUCOSE SERPL-MCNC: 96 MG/DL — SIGNIFICANT CHANGE UP (ref 70–99)
HCT VFR BLD CALC: 37.1 % — LOW (ref 39–50)
HGB BLD-MCNC: 12.5 G/DL — LOW (ref 13–17)
MCHC RBC-ENTMCNC: 33.7 G/DL — SIGNIFICANT CHANGE UP (ref 32–36)
MCHC RBC-ENTMCNC: 34.3 PG — HIGH (ref 27–34)
MCV RBC AUTO: 101.9 FL — HIGH (ref 80–100)
NRBC # BLD: 0 /100 WBCS — SIGNIFICANT CHANGE UP (ref 0–0)
PLATELET # BLD AUTO: 152 K/UL — SIGNIFICANT CHANGE UP (ref 150–400)
POTASSIUM SERPL-MCNC: 4.9 MMOL/L — SIGNIFICANT CHANGE UP (ref 3.5–5.3)
POTASSIUM SERPL-SCNC: 4.9 MMOL/L — SIGNIFICANT CHANGE UP (ref 3.5–5.3)
RBC # BLD: 3.64 M/UL — LOW (ref 4.2–5.8)
RBC # FLD: 13.9 % — SIGNIFICANT CHANGE UP (ref 10.3–14.5)
SODIUM SERPL-SCNC: 142 MMOL/L — SIGNIFICANT CHANGE UP (ref 135–145)
WBC # BLD: 4.21 K/UL — SIGNIFICANT CHANGE UP (ref 3.8–10.5)
WBC # FLD AUTO: 4.21 K/UL — SIGNIFICANT CHANGE UP (ref 3.8–10.5)

## 2023-02-15 PROCEDURE — 73560 X-RAY EXAM OF KNEE 1 OR 2: CPT | Mod: 26,LT

## 2023-02-15 PROCEDURE — 27447 TOTAL KNEE ARTHROPLASTY: CPT | Mod: AS,LT

## 2023-02-15 PROCEDURE — 20985 CPTR-ASST DIR MS PX: CPT

## 2023-02-15 PROCEDURE — 27447 TOTAL KNEE ARTHROPLASTY: CPT | Mod: LT

## 2023-02-15 DEVICE — COMP FEM CR CMNTLSS BEADED W/ PA SZ 5 LT: Type: IMPLANTABLE DEVICE | Site: LEFT | Status: FUNCTIONAL

## 2023-02-15 DEVICE — INSERT TIB BEARING CS X3 SZ 6 9MM: Type: IMPLANTABLE DEVICE | Site: LEFT | Status: FUNCTIONAL

## 2023-02-15 DEVICE — PATELLA ASYMM TRIATHLON SZ A 32X10MM: Type: IMPLANTABLE DEVICE | Site: LEFT | Status: FUNCTIONAL

## 2023-02-15 DEVICE — MAKO BONE PIN 3.2MM X 140MM: Type: IMPLANTABLE DEVICE | Site: LEFT | Status: FUNCTIONAL

## 2023-02-15 DEVICE — BASEPLATE TIB TRIATHLON TRITAN SZ 6: Type: IMPLANTABLE DEVICE | Site: LEFT | Status: FUNCTIONAL

## 2023-02-15 DEVICE — MAKO BONE PIN 3.2MM X 110MM: Type: IMPLANTABLE DEVICE | Site: LEFT | Status: FUNCTIONAL

## 2023-02-15 RX ORDER — ONDANSETRON 8 MG/1
4 TABLET, FILM COATED ORAL EVERY 6 HOURS
Refills: 0 | Status: DISCONTINUED | OUTPATIENT
Start: 2023-02-15 | End: 2023-02-16

## 2023-02-15 RX ORDER — ACETAMINOPHEN 500 MG
975 TABLET ORAL EVERY 8 HOURS
Refills: 0 | Status: DISCONTINUED | OUTPATIENT
Start: 2023-02-15 | End: 2023-02-16

## 2023-02-15 RX ORDER — HYDROMORPHONE HYDROCHLORIDE 2 MG/ML
0.5 INJECTION INTRAMUSCULAR; INTRAVENOUS; SUBCUTANEOUS ONCE
Refills: 0 | Status: DISCONTINUED | OUTPATIENT
Start: 2023-02-15 | End: 2023-02-16

## 2023-02-15 RX ORDER — HYDROMORPHONE HYDROCHLORIDE 2 MG/ML
0.5 INJECTION INTRAMUSCULAR; INTRAVENOUS; SUBCUTANEOUS
Refills: 0 | Status: DISCONTINUED | OUTPATIENT
Start: 2023-02-15 | End: 2023-02-15

## 2023-02-15 RX ORDER — ACETAMINOPHEN 500 MG
650 TABLET ORAL ONCE
Refills: 0 | Status: COMPLETED | OUTPATIENT
Start: 2023-02-15 | End: 2023-02-15

## 2023-02-15 RX ORDER — SODIUM CHLORIDE 9 MG/ML
1000 INJECTION, SOLUTION INTRAVENOUS
Refills: 0 | Status: DISCONTINUED | OUTPATIENT
Start: 2023-02-15 | End: 2023-02-15

## 2023-02-15 RX ORDER — DEXAMETHASONE 0.5 MG/5ML
10 ELIXIR ORAL ONCE
Refills: 0 | Status: COMPLETED | OUTPATIENT
Start: 2023-02-16 | End: 2023-02-16

## 2023-02-15 RX ORDER — HYDROMORPHONE HYDROCHLORIDE 2 MG/ML
2 INJECTION INTRAMUSCULAR; INTRAVENOUS; SUBCUTANEOUS EVERY 4 HOURS
Refills: 0 | Status: DISCONTINUED | OUTPATIENT
Start: 2023-02-15 | End: 2023-02-16

## 2023-02-15 RX ORDER — CELECOXIB 200 MG/1
200 CAPSULE ORAL ONCE
Refills: 0 | Status: COMPLETED | OUTPATIENT
Start: 2023-02-15 | End: 2023-02-15

## 2023-02-15 RX ORDER — KETOROLAC TROMETHAMINE 30 MG/ML
15 SYRINGE (ML) INJECTION EVERY 6 HOURS
Refills: 0 | Status: COMPLETED | OUTPATIENT
Start: 2023-02-15 | End: 2023-02-16

## 2023-02-15 RX ORDER — INFLUENZA VIRUS VACCINE 15; 15; 15; 15 UG/.5ML; UG/.5ML; UG/.5ML; UG/.5ML
0.7 SUSPENSION INTRAMUSCULAR ONCE
Refills: 0 | Status: DISCONTINUED | OUTPATIENT
Start: 2023-02-15 | End: 2023-02-16

## 2023-02-15 RX ORDER — HYDROMORPHONE HYDROCHLORIDE 2 MG/ML
4 INJECTION INTRAMUSCULAR; INTRAVENOUS; SUBCUTANEOUS EVERY 4 HOURS
Refills: 0 | Status: DISCONTINUED | OUTPATIENT
Start: 2023-02-15 | End: 2023-02-16

## 2023-02-15 RX ORDER — ACETAMINOPHEN 500 MG
1000 TABLET ORAL ONCE
Refills: 0 | Status: COMPLETED | OUTPATIENT
Start: 2023-02-15 | End: 2023-02-15

## 2023-02-15 RX ORDER — CYCLOBENZAPRINE HYDROCHLORIDE 10 MG/1
10 TABLET, FILM COATED ORAL THREE TIMES A DAY
Refills: 0 | Status: DISCONTINUED | OUTPATIENT
Start: 2023-02-15 | End: 2023-02-16

## 2023-02-15 RX ORDER — ONDANSETRON 8 MG/1
4 TABLET, FILM COATED ORAL ONCE
Refills: 0 | Status: DISCONTINUED | OUTPATIENT
Start: 2023-02-15 | End: 2023-02-15

## 2023-02-15 RX ORDER — SERTRALINE 25 MG/1
50 TABLET, FILM COATED ORAL DAILY
Refills: 0 | Status: DISCONTINUED | OUTPATIENT
Start: 2023-02-15 | End: 2023-02-16

## 2023-02-15 RX ORDER — MECLIZINE HCL 12.5 MG
12.5 TABLET ORAL THREE TIMES A DAY
Refills: 0 | Status: DISCONTINUED | OUTPATIENT
Start: 2023-02-15 | End: 2023-02-16

## 2023-02-15 RX ORDER — SENNA PLUS 8.6 MG/1
2 TABLET ORAL AT BEDTIME
Refills: 0 | Status: DISCONTINUED | OUTPATIENT
Start: 2023-02-15 | End: 2023-02-16

## 2023-02-15 RX ORDER — SODIUM CHLORIDE 9 MG/ML
500 INJECTION INTRAMUSCULAR; INTRAVENOUS; SUBCUTANEOUS ONCE
Refills: 0 | Status: COMPLETED | OUTPATIENT
Start: 2023-02-15 | End: 2023-02-15

## 2023-02-15 RX ORDER — SODIUM CHLORIDE 9 MG/ML
1000 INJECTION INTRAMUSCULAR; INTRAVENOUS; SUBCUTANEOUS
Refills: 0 | Status: DISCONTINUED | OUTPATIENT
Start: 2023-02-15 | End: 2023-02-16

## 2023-02-15 RX ORDER — PANTOPRAZOLE SODIUM 20 MG/1
40 TABLET, DELAYED RELEASE ORAL
Refills: 0 | Status: DISCONTINUED | OUTPATIENT
Start: 2023-02-15 | End: 2023-02-15

## 2023-02-15 RX ORDER — GABAPENTIN 400 MG/1
100 CAPSULE ORAL THREE TIMES A DAY
Refills: 0 | Status: DISCONTINUED | OUTPATIENT
Start: 2023-02-15 | End: 2023-02-16

## 2023-02-15 RX ORDER — CEFAZOLIN SODIUM 1 G
2000 VIAL (EA) INJECTION EVERY 8 HOURS
Refills: 0 | Status: COMPLETED | OUTPATIENT
Start: 2023-02-15 | End: 2023-02-16

## 2023-02-15 RX ORDER — LANOLIN ALCOHOL/MO/W.PET/CERES
3 CREAM (GRAM) TOPICAL AT BEDTIME
Refills: 0 | Status: DISCONTINUED | OUTPATIENT
Start: 2023-02-15 | End: 2023-02-16

## 2023-02-15 RX ORDER — POLYETHYLENE GLYCOL 3350 17 G/17G
17 POWDER, FOR SOLUTION ORAL AT BEDTIME
Refills: 0 | Status: DISCONTINUED | OUTPATIENT
Start: 2023-02-15 | End: 2023-02-16

## 2023-02-15 RX ORDER — LATANOPROST 0.05 MG/ML
1 SOLUTION/ DROPS OPHTHALMIC; TOPICAL AT BEDTIME
Refills: 0 | Status: DISCONTINUED | OUTPATIENT
Start: 2023-02-15 | End: 2023-02-16

## 2023-02-15 RX ORDER — ESCITALOPRAM OXALATE 10 MG/1
10 TABLET, FILM COATED ORAL DAILY
Refills: 0 | Status: DISCONTINUED | OUTPATIENT
Start: 2023-02-15 | End: 2023-02-16

## 2023-02-15 RX ORDER — PANTOPRAZOLE SODIUM 20 MG/1
40 TABLET, DELAYED RELEASE ORAL
Refills: 0 | Status: DISCONTINUED | OUTPATIENT
Start: 2023-02-15 | End: 2023-02-16

## 2023-02-15 RX ORDER — MAGNESIUM HYDROXIDE 400 MG/1
30 TABLET, CHEWABLE ORAL DAILY
Refills: 0 | Status: DISCONTINUED | OUTPATIENT
Start: 2023-02-15 | End: 2023-02-16

## 2023-02-15 RX ORDER — ASPIRIN/CALCIUM CARB/MAGNESIUM 324 MG
81 TABLET ORAL
Refills: 0 | Status: DISCONTINUED | OUTPATIENT
Start: 2023-02-16 | End: 2023-02-16

## 2023-02-15 RX ADMIN — Medication 100 MILLIGRAM(S): at 23:40

## 2023-02-15 RX ADMIN — HYDROMORPHONE HYDROCHLORIDE 4 MILLIGRAM(S): 2 INJECTION INTRAMUSCULAR; INTRAVENOUS; SUBCUTANEOUS at 19:48

## 2023-02-15 RX ADMIN — HYDROMORPHONE HYDROCHLORIDE 4 MILLIGRAM(S): 2 INJECTION INTRAMUSCULAR; INTRAVENOUS; SUBCUTANEOUS at 19:19

## 2023-02-15 RX ADMIN — Medication 3 MILLIGRAM(S): at 21:38

## 2023-02-15 RX ADMIN — CELECOXIB 200 MILLIGRAM(S): 200 CAPSULE ORAL at 12:47

## 2023-02-15 RX ADMIN — SENNA PLUS 2 TABLET(S): 8.6 TABLET ORAL at 21:37

## 2023-02-15 RX ADMIN — Medication 15 MILLIGRAM(S): at 23:44

## 2023-02-15 RX ADMIN — SODIUM CHLORIDE 500 MILLILITER(S): 9 INJECTION INTRAMUSCULAR; INTRAVENOUS; SUBCUTANEOUS at 17:14

## 2023-02-15 RX ADMIN — LATANOPROST 1 DROP(S): 0.05 SOLUTION/ DROPS OPHTHALMIC; TOPICAL at 21:39

## 2023-02-15 RX ADMIN — SODIUM CHLORIDE 125 MILLILITER(S): 9 INJECTION INTRAMUSCULAR; INTRAVENOUS; SUBCUTANEOUS at 19:20

## 2023-02-15 RX ADMIN — Medication 1000 MILLIGRAM(S): at 21:37

## 2023-02-15 RX ADMIN — Medication 400 MILLIGRAM(S): at 21:37

## 2023-02-15 RX ADMIN — POLYETHYLENE GLYCOL 3350 17 GRAM(S): 17 POWDER, FOR SOLUTION ORAL at 21:37

## 2023-02-15 RX ADMIN — Medication 15 MILLIGRAM(S): at 23:59

## 2023-02-15 RX ADMIN — SODIUM CHLORIDE 3 MILLILITER(S): 9 INJECTION INTRAMUSCULAR; INTRAVENOUS; SUBCUTANEOUS at 21:32

## 2023-02-15 RX ADMIN — GABAPENTIN 100 MILLIGRAM(S): 400 CAPSULE ORAL at 21:38

## 2023-02-15 RX ADMIN — SODIUM CHLORIDE 3 MILLILITER(S): 9 INJECTION INTRAMUSCULAR; INTRAVENOUS; SUBCUTANEOUS at 18:32

## 2023-02-15 RX ADMIN — Medication 650 MILLIGRAM(S): at 12:47

## 2023-02-15 NOTE — ASU PREOP CHECKLIST - CHLOROHEXIDINE WASH IN ASU
"Medical Social Work    SW received call from [facility] that they can still accept pt despite lack of BM. SW recevied call from bedside RN that pt stated she'd \"rather go home than to [facility]\" even though per TCN note, pt chose [facility] as first choice on Friday. SW will meet with pt at bedside after this SW is out of IDT rounds to discuss.     " 15-Feb-2023

## 2023-02-15 NOTE — DISCHARGE NOTE PROVIDER - NSDCFUSCHEDAPPT_GEN_ALL_CORE_FT
[facility] Physician [facility]  ONCORTHO [address]  Scheduled Appointment: 02/28/2023    Prince Winter  [facility]  ONCPAINMGT [address]  Scheduled Appointment: 04/20/2023

## 2023-02-15 NOTE — DISCHARGE NOTE PROVIDER - NSDCFUADDINST_GEN_ALL_CORE_FT
Keep Prineo Dressing Clean, Dry and Intact. May shower with Prineo Dressing. Please do not scrub, soak, peel or pick at the prineo dressing. No creams, lotions, or oils over dressing. May shower and let water run over incision, no baths. Pat dry once out of shower. Dressing to be removed in office at follow up visit in 2 weeks. There are no staples or stitches that need to be removed.  If you notice any redness, irritation, or itching around the prineo dressing call the surgeon's office    Per Dr. Siddiqui: may advance from walker as tolerated per discretion of physical therapist.     Keep knee straight while at rest. Elevate the leg as much as possible ("toes above the nose") to help control swelling. Make sure you get up and take a brief walk every two hours to help with circulation and prevent stiffness. Incentive spirometer 10X/hour. Cryocuff to help with pain/inflammation.

## 2023-02-15 NOTE — DISCHARGE NOTE PROVIDER - NSDCMRMEDTOKEN_GEN_ALL_CORE_FT
aspirin 325 mg oral tablet: 1 tab(s) orally once a day  cyclobenzaprine:  orally   cyclobenzaprine 10 mg oral tablet: 1 tab(s) orally 3 times a day  diazepam:  orally   escitalopram 10 mg oral tablet: 1 tab(s) orally once a day  gabapentin 300 mg oral tablet: 1 tab(s) orally 3 times a day  HYDROcodone:  orally   meclizine 12.5 mg oral tablet: 1 tab(s) orally 3 times a day, As Needed  mupirocin 2% topical ointment: Apply topically to affected area 2 times a day MDD:2  pantoprazole 40 mg oral delayed release tablet: 1 tab(s) orally once a day  sertraline 50 mg oral tablet: 1 tab(s) orally once a day  travoprost 0.004% ophthalmic solution: 1 drop(s) to each affected eye once a day (in the evening)  Vascepa 1 g oral capsule: 1 cap(s) orally 3 times a day   aspirin 325 mg oral tablet: 2 tab(s) orally once a day x 30 days  cyclobenzaprine 10 mg oral tablet: 1 tab(s) orally 3 times a day  escitalopram 10 mg oral tablet: 1 tab(s) orally once a day  gabapentin 300 mg oral tablet: 1 tab(s) orally 3 times a day  HYDROmorphone 2 mg oral tablet: 1 tab(s) orally every 4 hours, As needed, Pain MDD:6  meclizine 12.5 mg oral tablet: 1 tab(s) orally 3 times a day, As Needed  Multiple Vitamins oral tablet: 1 tab(s) orally once a day  pantoprazole 40 mg oral delayed release tablet: 1 tab(s) orally once a day MDD:1  senna leaf extract oral tablet: 2 tab(s) orally once a day (at bedtime)  sertraline 50 mg oral tablet: 1 tab(s) orally once a day  travoprost 0.004% ophthalmic solution: 1 drop(s) to each affected eye once a day (in the evening)  Vascepa 1 g oral capsule: 1 cap(s) orally 3 times a day

## 2023-02-15 NOTE — PHARMACOTHERAPY INTERVENTION NOTE - COMMENTS
Spoke w/ PA regarding duplicate SSRI therapy. Pt appears to be on Lexapro (Escitalopram) & Zoloft (Sertraline) outpatient. Pt will remain on current medication regimen as is and be monitored, due for discharge tomorrow.

## 2023-02-15 NOTE — PATIENT PROFILE ADULT - MONEY FOR FOOD
Dressing placed via RT.      Laure Lou RN  10/18/21 [id number]
no
IV intact/Indwelling catheter secured and draining

## 2023-02-15 NOTE — PHYSICAL THERAPY INITIAL EVALUATION ADULT - RANGE OF MOTION EXAMINATION, REHAB EVAL
L knee ROM flexion 85 degrees, extension - 5 degrees./Right LE ROM was WFL (within functional limits)/deficits as listed below

## 2023-02-15 NOTE — DISCHARGE NOTE PROVIDER - NSDCFUADDAPPT_GEN_ALL_CORE_FT
Follow up with your surgeon in two weeks. Call for appointment.    If you need more pain medications, call your surgeon's office. For medication refills or authorizations call [phone]    Call and schedule a follow up appointment with your primary care physician for repeat blood work (CBC and BMP) for post hospital discharge follow-up care.    Call your surgeon if you have increased redness/pain/drainage or fever. Return to ER for shortness of breath/calf tenderness.

## 2023-02-15 NOTE — DISCHARGE NOTE PROVIDER - CARE PROVIDER_API CALL
Zac Siddiqui)  Orthopaedic Surgery  [address] [place] [address]  Phone: [phone]  Fax: [phone]  Follow Up Time:

## 2023-02-15 NOTE — PHYSICAL THERAPY INITIAL EVALUATION ADULT - ADDITIONAL COMMENTS
as per patient he lives in a PH with 3 steps with L HR, once inside he has 11 steps with no rail to access bedroom, pt was independent with cane PRN.

## 2023-02-15 NOTE — DISCHARGE NOTE PROVIDER - HOSPITAL COURSE
72yMale with history of OA presenting for L TKA by Dr. Siddiqui on 2/15/2023. Risk and benefits of surgery were explained to the patient. The patient understood and agreed to proceed with surgery. Patient underwent the procedure with no intraoperative complications. Pt was brought in stable condition to the PACU. Once stable in PACU, pt was brought to the floor. During hospital stay pt was followed by Medicine, physical therapy, Home Care during this admission. Pt had an uneventful hospital course. Pt is stable for discharge to home.

## 2023-02-15 NOTE — PHYSICAL THERAPY INITIAL EVALUATION ADULT - ACTIVE RANGE OF MOTION EXAMINATION, REHAB EVAL
L knee ROM flexion 85 degrees, extension - 5 degrees./Right LE Active ROM was WFL (within functional limits)/deficits as listed below

## 2023-02-15 NOTE — PHYSICAL THERAPY INITIAL EVALUATION ADULT - PLANNED THERAPY INTERVENTIONS, PT EVAL
To be able to perform stair negotiation with 1 device and 1 hand rails Independently to improve access and exiting from home and access to bedrooms, basement and bathrooms by 2 weeks/balance training/bed mobility training/gait training/strengthening/transfer training

## 2023-02-15 NOTE — DISCHARGE NOTE PROVIDER - NSDCCPTREATMENT_GEN_ALL_CORE_FT
PRINCIPAL PROCEDURE  Procedure: Left total knee arthroplasty  Findings and Treatment: CATHERINE

## 2023-02-15 NOTE — PATIENT PROFILE ADULT - FALL HARM RISK - HARM RISK INTERVENTIONS

## 2023-02-15 NOTE — CONSULT NOTE ADULT - SUBJECTIVE AND OBJECTIVE BOX
HANG BOWLES is a 72y Male s/p ROBOTIC ASSISTED LEFT TOTAL KNEE ARTHROPLASTY WITH CATHERINE      w/ h/o HLD (hyperlipidemia)    Vertigo    Anxiety and depression    History of glaucoma    CVA (cerebrovascular accident)      denies any chest pain shortness of breath palpitation dizziness lightheadedness nausea vomiting fever or chills    S/P knee surgery        SH: doesnot smoke or drink at this time    No Known Drug Allergies  sulfites (Urticaria; Rash)    acetaminophen     Tablet .. 975 milliGRAM(s) Oral every 8 hours  acetaminophen   IVPB .. 1000 milliGRAM(s) IV Intermittent once  ceFAZolin   IVPB 2000 milliGRAM(s) IV Intermittent every 8 hours  cyclobenzaprine 10 milliGRAM(s) Oral three times a day PRN  escitalopram 10 milliGRAM(s) Oral daily  gabapentin 100 milliGRAM(s) Oral three times a day  HYDROmorphone   Tablet 2 milliGRAM(s) Oral every 4 hours PRN  HYDROmorphone   Tablet 4 milliGRAM(s) Oral every 4 hours PRN  HYDROmorphone  Injectable 0.5 milliGRAM(s) IV Push once  influenza  Vaccine (HIGH DOSE) 0.7 milliLiter(s) IntraMuscular once  ketorolac   Injectable 15 milliGRAM(s) IV Push every 6 hours  latanoprost 0.005% Ophthalmic Solution 1 Drop(s) Both EYES at bedtime  magnesium hydroxide Suspension 30 milliLiter(s) Oral daily PRN  meclizine 12.5 milliGRAM(s) Oral three times a day PRN  melatonin 3 milliGRAM(s) Oral at bedtime PRN  multivitamin 1 Tablet(s) Oral daily  ondansetron Injectable 4 milliGRAM(s) IV Push every 6 hours PRN  pantoprazole    Tablet 40 milliGRAM(s) Oral before breakfast  polyethylene glycol 3350 17 Gram(s) Oral at bedtime  senna 2 Tablet(s) Oral at bedtime  sertraline 50 milliGRAM(s) Oral daily  sodium chloride 0.9% lock flush 3 milliLiter(s) IV Push every 8 hours  sodium chloride 0.9%. 1000 milliLiter(s) IV Continuous <Continuous>    T(C): 36.7 (02-15-23 @ 19:29), Max: 36.7 (02-15-23 @ 12:21)  HR: 68 (02-15-23 @ 19:29) (54 - 72)  BP: 128/82 (02-15-23 @ 19:29) (116/82 - 156/73)  RR: 16 (02-15-23 @ 19:29) (11 - 17)  SpO2: 96% (02-15-23 @ 19:29) (95% - 100%)  HEENT unremarkable  neck no JVD or bruit  heart normal S1 S2 RRR no gallops or rubs  chest clear to auscultation  abd sof nontender non distended +bs  ext no calf tenderness    A/P   DVT PX  pain control  bowel regimen   wound care as per ortho  GI PX  antiemetics prn  incentive spirometer

## 2023-02-16 ENCOUNTER — TRANSCRIPTION ENCOUNTER (OUTPATIENT)
Age: 73
End: 2023-02-16

## 2023-02-16 VITALS
HEART RATE: 79 BPM | OXYGEN SATURATION: 97 % | SYSTOLIC BLOOD PRESSURE: 121 MMHG | DIASTOLIC BLOOD PRESSURE: 73 MMHG | RESPIRATION RATE: 18 BRPM | TEMPERATURE: 98 F

## 2023-02-16 LAB
ANION GAP SERPL CALC-SCNC: 7 MMOL/L — SIGNIFICANT CHANGE UP (ref 5–17)
BUN SERPL-MCNC: 17 MG/DL — SIGNIFICANT CHANGE UP (ref 7–23)
CALCIUM SERPL-MCNC: 8.2 MG/DL — LOW (ref 8.5–10.1)
CHLORIDE SERPL-SCNC: 108 MMOL/L — SIGNIFICANT CHANGE UP (ref 96–108)
CO2 SERPL-SCNC: 25 MMOL/L — SIGNIFICANT CHANGE UP (ref 22–31)
CREAT SERPL-MCNC: 1.02 MG/DL — SIGNIFICANT CHANGE UP (ref 0.5–1.3)
EGFR: 78 ML/MIN/1.73M2 — SIGNIFICANT CHANGE UP
GLUCOSE SERPL-MCNC: 129 MG/DL — HIGH (ref 70–99)
HCT VFR BLD CALC: 33 % — LOW (ref 39–50)
HGB BLD-MCNC: 11 G/DL — LOW (ref 13–17)
MCHC RBC-ENTMCNC: 33.3 G/DL — SIGNIFICANT CHANGE UP (ref 32–36)
MCHC RBC-ENTMCNC: 34.1 PG — HIGH (ref 27–34)
MCV RBC AUTO: 102.2 FL — HIGH (ref 80–100)
NRBC # BLD: 0 /100 WBCS — SIGNIFICANT CHANGE UP (ref 0–0)
PLATELET # BLD AUTO: 155 K/UL — SIGNIFICANT CHANGE UP (ref 150–400)
POTASSIUM SERPL-MCNC: 4.4 MMOL/L — SIGNIFICANT CHANGE UP (ref 3.5–5.3)
POTASSIUM SERPL-SCNC: 4.4 MMOL/L — SIGNIFICANT CHANGE UP (ref 3.5–5.3)
RBC # BLD: 3.23 M/UL — LOW (ref 4.2–5.8)
RBC # FLD: 13.9 % — SIGNIFICANT CHANGE UP (ref 10.3–14.5)
SODIUM SERPL-SCNC: 140 MMOL/L — SIGNIFICANT CHANGE UP (ref 135–145)
WBC # BLD: 10.54 K/UL — HIGH (ref 3.8–10.5)
WBC # FLD AUTO: 10.54 K/UL — HIGH (ref 3.8–10.5)

## 2023-02-16 RX ORDER — PANTOPRAZOLE SODIUM 20 MG/1
1 TABLET, DELAYED RELEASE ORAL
Qty: 0 | Refills: 0 | DISCHARGE

## 2023-02-16 RX ORDER — HYDROMORPHONE HYDROCHLORIDE 2 MG/ML
1 INJECTION INTRAMUSCULAR; INTRAVENOUS; SUBCUTANEOUS
Qty: 42 | Refills: 0
Start: 2023-02-16 | End: 2023-02-22

## 2023-02-16 RX ORDER — ASPIRIN/CALCIUM CARB/MAGNESIUM 324 MG
1 TABLET ORAL
Qty: 0 | Refills: 0 | DISCHARGE

## 2023-02-16 RX ORDER — SENNA PLUS 8.6 MG/1
2 TABLET ORAL
Qty: 0 | Refills: 0 | DISCHARGE
Start: 2023-02-16

## 2023-02-16 RX ORDER — PANTOPRAZOLE SODIUM 20 MG/1
1 TABLET, DELAYED RELEASE ORAL
Qty: 30 | Refills: 0
Start: 2023-02-16 | End: 2023-03-17

## 2023-02-16 RX ADMIN — Medication 15 MILLIGRAM(S): at 12:26

## 2023-02-16 RX ADMIN — ESCITALOPRAM OXALATE 10 MILLIGRAM(S): 10 TABLET, FILM COATED ORAL at 13:57

## 2023-02-16 RX ADMIN — Medication 975 MILLIGRAM(S): at 05:40

## 2023-02-16 RX ADMIN — SODIUM CHLORIDE 3 MILLILITER(S): 9 INJECTION INTRAMUSCULAR; INTRAVENOUS; SUBCUTANEOUS at 06:27

## 2023-02-16 RX ADMIN — Medication 81 MILLIGRAM(S): at 05:41

## 2023-02-16 RX ADMIN — SODIUM CHLORIDE 3 MILLILITER(S): 9 INJECTION INTRAMUSCULAR; INTRAVENOUS; SUBCUTANEOUS at 13:30

## 2023-02-16 RX ADMIN — Medication 15 MILLIGRAM(S): at 12:41

## 2023-02-16 RX ADMIN — Medication 102 MILLIGRAM(S): at 05:39

## 2023-02-16 RX ADMIN — Medication 100 MILLIGRAM(S): at 07:58

## 2023-02-16 RX ADMIN — GABAPENTIN 100 MILLIGRAM(S): 400 CAPSULE ORAL at 05:40

## 2023-02-16 RX ADMIN — GABAPENTIN 100 MILLIGRAM(S): 400 CAPSULE ORAL at 13:59

## 2023-02-16 RX ADMIN — Medication 1 TABLET(S): at 12:25

## 2023-02-16 RX ADMIN — PANTOPRAZOLE SODIUM 40 MILLIGRAM(S): 20 TABLET, DELAYED RELEASE ORAL at 05:41

## 2023-02-16 RX ADMIN — Medication 975 MILLIGRAM(S): at 06:30

## 2023-02-16 RX ADMIN — Medication 975 MILLIGRAM(S): at 13:58

## 2023-02-16 RX ADMIN — Medication 15 MILLIGRAM(S): at 06:30

## 2023-02-16 RX ADMIN — Medication 975 MILLIGRAM(S): at 14:31

## 2023-02-16 RX ADMIN — Medication 15 MILLIGRAM(S): at 05:41

## 2023-02-16 RX ADMIN — SERTRALINE 50 MILLIGRAM(S): 25 TABLET, FILM COATED ORAL at 12:25

## 2023-02-16 NOTE — OCCUPATIONAL THERAPY INITIAL EVALUATION ADULT - NSOTDISCHREC_GEN_A_CORE
to remediate deficit areas in order to achieve functional independent with ADL management and functional mobility . Pt has a  rolling walker, SAC a 3:1 commode/Home OT

## 2023-02-16 NOTE — OCCUPATIONAL THERAPY INITIAL EVALUATION ADULT - LIVES WITH, PROFILE
in a private house with 3 entry steps equipped with left ascending handrail.. Once inside, pt has to negotiate a flight of stairs with 11 steps without to access the bedroom and bathroom.. Most living amenities are located on one level.  The bathroom has a tub/shower combination, fixed shower head and standard toilet with adequate space to fit a commode over it. ./spouse in a private house with 3 entry steps equipped with left ascending handrail. Once inside, pt has to negotiate a flight of stairs with 11 steps without to access the bedroom and bathroom.. Most living amenities, including other bedrooms, are located on one level.  The bathroom has a tub/shower combination, fixed shower head and standard toilet with adequate space to fit a commode over it. ./spouse

## 2023-02-16 NOTE — DISCHARGE NOTE NURSING/CASE MANAGEMENT/SOCIAL WORK - NSDCFUADDAPPT_GEN_ALL_CORE_FT
Follow up with your surgeon in two weeks. Call for appointment.    If you need more pain medications, call your surgeon's office. For medication refills or authorizations call [phone]    Call and schedule a follow up appointment with your primary care physician for repeat blood work (CBC and BMP) for post hospital discharge follow-up care.    Call your surgeon if you have increased redness/pain/drainage or fever. Return to ER for shortness of breath/calf tenderness.

## 2023-02-16 NOTE — DISCHARGE NOTE NURSING/CASE MANAGEMENT/SOCIAL WORK - NSDCPEFALRISK_GEN_ALL_CORE
For information on Fall & Injury Prevention, visit: [email or web address] OR  [email or web address] OR  https://www.cdc.gov/steadi/patient.html

## 2023-02-16 NOTE — DISCHARGE NOTE NURSING/CASE MANAGEMENT/SOCIAL WORK - PATIENT PORTAL LINK FT
You can access the FollowMyHealth Patient Portal offered by [facility] by registering at the following website: [email or web address]. By joining [facility] FollowMyHealth portal, you will also be able to view your health information using other applications (apps) compatible with our system.

## 2023-02-16 NOTE — OCCUPATIONAL THERAPY INITIAL EVALUATION ADULT - ADDITIONAL COMMENTS
none
Prior to admission, pt was functioning in his roles, self sufficient & ambulating independently without any assistive devices.  Pt utilized a SAC PRN when he had exacerbation of pain. Presently pt needs assistance with lower body self care tasks due to pain, weakness, stiffness and decreased ROM from left TKR. Pt is right hand dominant and wears glasses all the time.

## 2023-02-16 NOTE — OCCUPATIONAL THERAPY INITIAL EVALUATION ADULT - SOCIAL CONCERNS
Pt voiced concerns about his recovery at home. Pt endorsed that his spouse will be able to assist her after he is discharged home post-operatively./Complex psychosocial needs/coping issues

## 2023-02-16 NOTE — OCCUPATIONAL THERAPY INITIAL EVALUATION ADULT - MD ORDER
7/27/2022  IVernon MD, saw and evaluated the patient  I have discussed the patient with the resident/non-physician practitioner and agree with the resident's/non-physician practitioner's findings, Plan of Care, and MDM as documented in the resident's/non-physician practitioner's note, except where noted  All available labs and Radiology studies were reviewed  I was present for key portions of any procedure(s) performed by the resident/non-physician practitioner and I was immediately available to provide assistance  At this point I agree with the current assessment done in the Emergency Department  I have conducted an independent evaluation of this patient a history and physical is as follows: This is a 39 y o  old male who presents to the ED for evaluation of hand injury  Right-hand dominant  Was catching a ball with the left hand when he dove in his hand at the something  Has pain and swelling over the medial dorsal aspect over the 5th metacarpal   Decreased range of motion secondary to pain  No neurologic deficits  Patient appears well nourished, normally developed, no acute distress  Vital signs as documented  Head exam is atraumatic  Pupils EOMI, no scleral icterus or corneal injection noted  Neck is supple without JVD  Respirations are normal without increase work of breathing or audible noises  Cardiac exam shows regular rate and rhythm  Patient is moving all extremities equally, able to ambulate with normal gait under own power  Tenderness over the distal 5th metacarpal   Distal neurovascular intact  Patient is awake, alert, oriented ×4 without focal neurologic deficit  Skin is warm, dry, intact without rash  A/P: This is a 39 y o  male who presents to the ED for evaluation of pain injury    X-ray pain control re-evaluate disposition accordingly      ED Course         Critical Care Time  Procedures
OT Consult & RX

## 2023-02-16 NOTE — PROGRESS NOTE ADULT - SUBJECTIVE AND OBJECTIVE BOX
Patient is 72y y/o Male s/p L TKA POD#1  Patient is seen and examined at bedside.   Pt tolerated procedure well without any intra-op complications.    Pain is controlled.  Denies CP/SOB/Dizziness/N/V/D/HA.     Vital Signs Last 24 Hrs  T(C): 36.8 (16 Feb 2023 10:40), Max: 37.2 (16 Feb 2023 09:20)  T(F): 98.2 (16 Feb 2023 10:40), Max: 98.9 (16 Feb 2023 09:20)  HR: 76 (16 Feb 2023 10:40) (53 - 76)  BP: 130/71 (16 Feb 2023 10:40) (116/68 - 156/73)  BP(mean): --  RR: 17 (16 Feb 2023 10:40) (11 - 18)  SpO2: 97% (16 Feb 2023 10:40) (93% - 100%)    Parameters below as of 16 Feb 2023 10:40  Patient On (Oxygen Delivery Method): room air          PHYSICAL EXAM:  General: A&Ox3 NAD  LLE: Dressing C/D/I with ACE wrap in place. Motor intact + EHL/FHL/TA/GS.  Sensation is grossly intact.  Extremity warm, compartments soft, compressible. No calf tenderness. DP 2+   RLE: Motor intact +EHL/FHL/TA/GS. Sensation is grossly intact. Extremity warm, compartments soft, compressible. No calf tenderness. DP2+    Labs:                          11.0   10.54 )-----------( 155      ( 16 Feb 2023 06:15 )             33.0       02-16    140  |  108  |  17  ----------------------------<  129<H>  4.4   |  25  |  1.02    Ca    8.2<L>      16 Feb 2023 06:15        A/P: Patient is a 72y y/o Male s/p L TKA, POD # 1  -wound care, knee extension/leg elevation, cryocuff, isometric exercises, new medications reviewed with pt  -Pain control/analgesia  -Inc spirometry reviewed with pt, demonstrated competence  -DVT prophylaxis with Venodynes/Aspirin 325 BID  -F/U AM Labs  -PT/OT/WBAT  -prophylactic Antibiotic  -medical consult  -DC planning    
HANG BOWLES is a 72y Male s/p ROBOTIC ASSISTED LEFT TOTAL KNEE ARTHROPLASTY WITH CATHERINE        denies any chest pain shortness of breath palpitation dizziness lightheadedness nausea vomiting fever or chills    T(C): 36.8 (02-16-23 @ 10:40), Max: 37.2 (02-16-23 @ 09:20)  HR: 76 (02-16-23 @ 10:40) (53 - 76)  BP: 130/71 (02-16-23 @ 10:40) (116/68 - 156/73)  RR: 17 (02-16-23 @ 10:40) (11 - 18)  SpO2: 97% (02-16-23 @ 10:40) (93% - 100%)  no jvd/bruit  s1 s2 rrr  cta  s/nt/nd  no calf tend                        11.0   10.54 )-----------( 155      ( 16 Feb 2023 06:15 )             33.0   02-16    140  |  108  |  17  ----------------------------<  129<H>  4.4   |  25  |  1.02    Ca    8.2<L>      16 Feb 2023 06:15        cont dvt px  pain control  bowel regimen  antiemetics  incentive spirometer
Post op Note  Patient is s/p L TKA under general anesthesia. Pt tolerated procedure well without any intra-op complications. Pt doing well at this time.  Denies CP/SOB/Dizziness/N/V/D/HA. Pain is controlled.     Vital Signs Last 24 Hrs  T(C): 36.3 (15 Feb 2023 17:38), Max: 36.7 (15 Feb 2023 12:21)  T(F): 97.4 (15 Feb 2023 17:38), Max: 98.1 (15 Feb 2023 12:21)  HR: 54 (15 Feb 2023 17:38) (54 - 72)  BP: 128/87 (15 Feb 2023 17:38) (121/81 - 156/73)  BP(mean): --  RR: 11 (15 Feb 2023 17:38) (11 - 17)  SpO2: 100% (15 Feb 2023 17:38) (95% - 100%)    Parameters below as of 15 Feb 2023 16:53  Patient On (Oxygen Delivery Method): room air        GEN: NAD  LLE: Dressing C/D/I.   B/L LE's: Motor intact + EHL/FHL/TA/GS in the BL LE. Sensation is grossly intact B/L. Extremities warm B/L. Compartments soft, compressible B/L, no calf tenderness B/L. DP 2+ B/L.    Labs:                          12.5   4.21  )-----------( 152      ( 15 Feb 2023 17:05 )             37.1       02-15    142  |  109<H>  |  14  ----------------------------<  96  4.9   |  29  |  1.04    Ca    8.6      15 Feb 2023 17:05        A/P: Patient is a 72y y/o Male s/p L TKA, POD #0  -wound care, isometric exercises, GI motility, new medications, hospital course and discharge planning reviewed with pt  -Pain control/analgesia  -Inc spirometry reviewed and counseled  -SCD's to B/L LE  -F/U AM Labs  -PT/OT/WBAT  -Antibiotic 24hours post-op  -Anticoagulation: ASA BID

## 2023-02-16 NOTE — OCCUPATIONAL THERAPY INITIAL EVALUATION ADULT - STRENGTHENING, PT EVAL
Pt will increased BUE/LE muscle strength by 1 full grade to maximize independence with functional mobility and self care tasks within 30 days

## 2023-02-16 NOTE — OCCUPATIONAL THERAPY INITIAL EVALUATION ADULT - RANGE OF MOTION EXAMINATION, LOWER EXTREMITY
AAROM in left knee is 0-60 degrees with pain at the end range./Left LE Active ROM was WFL (within functional limits)/Left LE Passive ROM was WFL (w/i functional limits)

## 2023-02-16 NOTE — OCCUPATIONAL THERAPY INITIAL EVALUATION ADULT - PLANNED THERAPY INTERVENTIONS, OT EVAL
energy conservation techniques/ADL retraining/IADL retraining/balance training/bed mobility training/parent/caregiver training.../ROM/strengthening/stretching/transfer training

## 2023-02-19 DIAGNOSIS — M17.12 UNILATERAL PRIMARY OSTEOARTHRITIS, LEFT KNEE: ICD-10-CM

## 2023-02-19 DIAGNOSIS — F41.9 ANXIETY DISORDER, UNSPECIFIED: ICD-10-CM

## 2023-02-19 DIAGNOSIS — E78.5 HYPERLIPIDEMIA, UNSPECIFIED: ICD-10-CM

## 2023-02-28 ENCOUNTER — RESULT CHARGE (OUTPATIENT)
Age: 73
End: 2023-02-28

## 2023-02-28 ENCOUNTER — APPOINTMENT (OUTPATIENT)
Dept: ORTHOPEDIC SURGERY | Facility: CLINIC | Age: 73
End: 2023-02-28
Payer: OTHER MISCELLANEOUS

## 2023-02-28 VITALS — WEIGHT: 236 LBS | BODY MASS INDEX: 37.93 KG/M2 | HEIGHT: 66 IN

## 2023-02-28 PROBLEM — Z86.69 PERSONAL HISTORY OF OTHER DISEASES OF THE NERVOUS SYSTEM AND SENSE ORGANS: Chronic | Status: ACTIVE | Noted: 2023-01-26

## 2023-02-28 PROBLEM — F41.9 ANXIETY DISORDER, UNSPECIFIED: Chronic | Status: ACTIVE | Noted: 2023-01-26

## 2023-02-28 PROCEDURE — 99072 ADDL SUPL MATRL&STAF TM PHE: CPT

## 2023-02-28 PROCEDURE — 73562 X-RAY EXAM OF KNEE 3: CPT | Mod: LT

## 2023-02-28 PROCEDURE — 99024 POSTOP FOLLOW-UP VISIT: CPT

## 2023-02-28 NOTE — ASSESSMENT
[FreeTextEntry1] : Previous doc:\par Mod/adv OA left knee, prior tib plateau fx. Progression on degen on XR over the psat 3 years. Cortisone inj today and work on weight loss to get BMI < 40.\par 6/21/19: Continue activities as tolerated and HEP. He has to get vertigo under control. Will consider cortisone at next appt.\par 9/13/19: Cont pain in left knee - tolerating it with home exercises. Working on weight loss and vertigo control. Uses cane for balance/vertigo.\par 12/13 knee is stable no sig changes - HEP - uising cane for balance - inj left shoulder today did help him in the past\par 4/22/20:Steroid inj helped in the past but want to wait until this passes but stable rights now - Vertigo is stable with occ episodes - Left shoulder inj helped a lot rarely has pain\par 6/2/20: Mult falls secondary to episodes of vertigo. Not much pain today so will hold on injections. Concerned about his ongoing vertigo causing mult falls and leading to further injury - needs to keep following this with PCP although he has been told it will get progressively worse over time and has no good short term solution. Had neg neuro and ENT eval in the past. Left shoulder repeat bursa inj today. - has been falling a lot due to knee and balance - has rib contusion and I feel he will benefit from lidocaine patches\par 9/4/20: Overall no change - tolerating pain well but has episodes of flareups after falls from vertigo. Discussed inj when pain is severe but for now will cont home exercises.\par 12/18/20: Stable since last visit. Seems do be doing ok with home exercises so will cont this for now and reeval in 3 months.\par 8/23/21: Hematoma throughout right calf - no signs of DVT. Recc cont with ice and elevation and restart PT when ab/le to work on quad strength which was likely cause for his knees giving out.\par 10/1 swelling and ecchimosis resolving but still some knee pain - unchanged and stable - shoulder is stable since the injection - he is currently permanent out of work\par 7/19/22: \par 3/29/22: Has falls every so often due to vertigo. Knees have pain when these episodes occur however today he is tolerating the pain well and declined injections.\par 6/21/22: Discussed TKA, r/b/a, and preop/postop periods in detail. LEft knee worse than the rt knee - planning for TKA in the fall - we will plan robotics get Knee CT scan to eval bone loss   \par 7/19/22: Cont pain, proceed with TKA when authorized.  CT was done.\par 10/25/22: still awaiting WC auth as pain is worsening as well as worsening right knee pain. FU in 4 weeks. plan to speak with  and EC department \par 11/22/22: Worsening pain in left knee. He is still pending surgical auth. again review surgery and post op coarse \par \par 2/28/23: 2 weeks postop doing well, some brusing but normal for course - cont PT, reeval in 4 weeks.

## 2023-02-28 NOTE — PHYSICAL EXAM
[AP] : anteroposterior [Lateral] : lateral [[place]] : skyline [Components well fixed, in good position] : Components well fixed, in good position [de-identified] : Left knee: Inc c/d/i.  ROM 3-100.  Lig stable.  Walker.  NVI.

## 2023-02-28 NOTE — DISCUSSION/SUMMARY
[de-identified] : The patient is doing well at this time. The patient will be started on a course of physical therapy. I recommended that the patient works on range of motion at home and was shown how to do this. I encouraged the patient to increase ambulation. The patient can continue to take Tylenol for occasional discomfort. The patient was advised not to do any dental work for the first three months following the surgery. We will see the patient  back for a follow-up for a repeat evaluation. The patient  will call or return earlier for any questions or concerns.  Signs and symptoms of infection reviewed and patient advised to call immediately for redness, fevers, and/or chills.\par

## 2023-02-28 NOTE — HISTORY OF PRESENT ILLNESS
[6] : 6 [4] : 4 [Radiating] : radiating [Stabbing] : stabbing [Throbbing] : throbbing [Physical therapy] : physical therapy [de-identified] : 2/28/23: 2 weeks s/p left TKA - pain since last night, caught himself from falling during vertigo episode and hit knee against wall.  Prior to this was doing very well min pain.\par \par Previous doc:\par Left knee injury in 2016 sustained lateral tibial plateau fx treated nonsurgically. Prior to that had arthroscopy 2008. Worsening pain over the past 3 years. PT in the past helped a little but had difficulty with this due to vertigo. Cortisone inj in the past helped. CVA 2017 now takes aspirin - weakness only in right pinky finger.\par 6/21/19: Symptoms remain the same. Injection helped temporary. Has good and bad days with knee depending on severity of vertigo.\par 9/13/19: Cont pain in left knee, no change overall.\par 12/13/19: Cont pain in knees but tolerable. Does home exercises. Left shoulder worsening x 3 months which is part of his WC case. Had inj in Feb which helped for several months - MRI in the past with partial cuff tearing. Pain wakes him up at night.\par 4/22/20: he has reji doing min due to COVId he is in some pain but tolerable\par 6/2/20: Mult falls from vertigo recently. Has knee pain only at the time of the fall and then resolves. Min pain today.\par 9/4/20: Had good relief from shoulder inj last time. Mult falls with vertigo, no change overall. Pain is daily but tolerable at this time.\par 12/18/20: No significant change since last time. has been doing home exercises which help.\par 8/23/21: Legs gave out while on the beach, fell and has pain and swelling in right leg. Seen at urgent care last week, had neg XRs.\par 10/1 overall most of the calf pain as swelling has resolved\par 3/29/22: Continued pain in b/l knees. Had another fall last month due to vertigo.\par 6/21/22: He returns with increasing pain in bilateral knees with increasing feeling of instability in his L knee. Pain is now affecting his ADLs\par 7/19/22: Cont pain, waiting for TKA auth.\par 10/25/22: continued pain in knees - is waiting for a surgery auth\par 11/22/22: Worsening pain in knee. He is still pending surgical auth.    [] : no [FreeTextEntry1] : L knee [FreeTextEntry5] : PO #1 L knee. Pt states he was doing well up until 2/27/2023 when he fell and hit his knee into a table. Pt states he has been compliant w home PT.  [FreeTextEntry7] : L shin [de-identified] : 2/158/2023 [de-identified] : home PT [de-identified] : 2/15/2023 [de-identified] : L TKR

## 2023-03-24 ENCOUNTER — RX RENEWAL (OUTPATIENT)
Age: 73
End: 2023-03-24

## 2023-03-27 ENCOUNTER — RX RENEWAL (OUTPATIENT)
Age: 73
End: 2023-03-27

## 2023-03-28 ENCOUNTER — APPOINTMENT (OUTPATIENT)
Dept: ORTHOPEDIC SURGERY | Facility: CLINIC | Age: 73
End: 2023-03-28
Payer: OTHER MISCELLANEOUS

## 2023-03-28 ENCOUNTER — RESULT CHARGE (OUTPATIENT)
Age: 73
End: 2023-03-28

## 2023-03-28 VITALS — BODY MASS INDEX: 37.61 KG/M2 | WEIGHT: 234 LBS | HEIGHT: 66 IN

## 2023-03-28 PROCEDURE — 73562 X-RAY EXAM OF KNEE 3: CPT | Mod: LT

## 2023-03-28 PROCEDURE — 99024 POSTOP FOLLOW-UP VISIT: CPT

## 2023-03-28 NOTE — PHYSICAL EXAM
[de-identified] : Left knee: Inc c/d/i.  ROM 0-120.  Lig stable.  NVI.  Walks without assistance.

## 2023-03-28 NOTE — DISCUSSION/SUMMARY
[de-identified] : The patient was advised of the diagnosis.  The natural history of the pathology was explained in full to the patient in layman's terms. All questions were answered.  The risks and benefits of surgical and non-surgical treatment alternatives were explained in full to the patient.\par

## 2023-03-28 NOTE — HISTORY OF PRESENT ILLNESS
[Work related] : work related [Dull/Aching] : dull/aching [Physical therapy] : physical therapy [] : Post Surgical Visit: yes [de-identified] : 3/28/23: 6 weeks postop min pain.  PT going well.  No fevers/chills.\par \par Previous doc:\par Left knee injury in 2016 sustained lateral tibial plateau fx treated nonsurgically. Prior to that had arthroscopy 2008. Worsening pain over the past 3 years. PT in the past helped a little but had difficulty with this due to vertigo. Cortisone inj in the past helped. CVA 2017 now takes aspirin - weakness only in right pinky finger.\par 6/21/19: Symptoms remain the same. Injection helped temporary. Has good and bad days with knee depending on severity of vertigo.\par 9/13/19: Cont pain in left knee, no change overall.\par 12/13/19: Cont pain in knees but tolerable. Does home exercises. Left shoulder worsening x 3 months which is part of his WC case. Had inj in Feb which helped for several months - MRI in the past with partial cuff tearing. Pain wakes him up at night.\par 4/22/20: he has reji doing min due to COVId he is in some pain but tolerable\par 6/2/20: Mult falls from vertigo recently. Has knee pain only at the time of the fall and then resolves. Min pain today.\par 9/4/20: Had good relief from shoulder inj last time. Mult falls with vertigo, no change overall. Pain is daily but tolerable at this time.\par 12/18/20: No significant change since last time. has been doing home exercises which help.\par 8/23/21: Legs gave out while on the beach, fell and has pain and swelling in right leg. Seen at urgent care last week, had neg XRs.\par 10/1 overall most of the calf pain as swelling has resolved\par 3/29/22: Continued pain in b/l knees. Had another fall last month due to vertigo.\par 6/21/22: He returns with increasing pain in bilateral knees with increasing feeling of instability in his L knee. Pain is now affecting his ADLs\par 7/19/22: Cont pain, waiting for TKA auth.\par 10/25/22: continued pain in knees - is waiting for a surgery auth\par 11/22/22: Worsening pain in knee. He is still pending surgical auth.   \par 2/28/23: 2 weeks s/p left TKA - pain since last night, caught himself from falling during vertigo episode and hit knee against wall.  Prior to this was doing very well min pain. [de-identified] : 2/15/23 [de-identified] : L TKR

## 2023-03-28 NOTE — ASSESSMENT
[FreeTextEntry1] : Previous doc:\par Mod/adv OA left knee, prior tib plateau fx. Progression on degen on XR over the psat 3 years. Cortisone inj today and work on weight loss to get BMI < 40.\par 6/21/19: Continue activities as tolerated and HEP. He has to get vertigo under control. Will consider cortisone at next appt.\par 9/13/19: Cont pain in left knee - tolerating it with home exercises. Working on weight loss and vertigo control. Uses cane for balance/vertigo.\par 12/13 knee is stable no sig changes - HEP - uising cane for balance - inj left shoulder today did help him in the past\par 4/22/20:Steroid inj helped in the past but want to wait until this passes but stable rights now - Vertigo is stable with occ episodes - Left shoulder inj helped a lot rarely has pain\par 6/2/20: Mult falls secondary to episodes of vertigo. Not much pain today so will hold on injections. Concerned about his ongoing vertigo causing mult falls and leading to further injury - needs to keep following this with PCP although he has been told it will get progressively worse over time and has no good short term solution. Had neg neuro and ENT eval in the past. Left shoulder repeat bursa inj today. - has been falling a lot due to knee and balance - has rib contusion and I feel he will benefit from lidocaine patches\par 9/4/20: Overall no change - tolerating pain well but has episodes of flareups after falls from vertigo. Discussed inj when pain is severe but for now will cont home exercises.\par 12/18/20: Stable since last visit. Seems do be doing ok with home exercises so will cont this for now and reeval in 3 months.\par 8/23/21: Hematoma throughout right calf - no signs of DVT. Recc cont with ice and elevation and restart PT when ab/le to work on quad strength which was likely cause for his knees giving out.\par 10/1 swelling and ecchimosis resolving but still some knee pain - unchanged and stable - shoulder is stable since the injection - he is currently permanent out of work\par 7/19/22: \par 3/29/22: Has falls every so often due to vertigo. Knees have pain when these episodes occur however today he is tolerating the pain well and declined injections.\par 6/21/22: Discussed TKA, r/b/a, and preop/postop periods in detail. LEft knee worse than the rt knee - planning for TKA in the fall - we will plan robotics get Knee CT scan to eval bone loss   \par 7/19/22: Cont pain, proceed with TKA when authorized.  CT was done.\par 10/25/22: still awaiting WC auth as pain is worsening as well as worsening right knee pain. FU in 4 weeks. plan to speak with  and EC department \par 11/22/22: Worsening pain in left knee. He is still pending surgical auth. again review surgery and post op coarse \par 2/28/23: 2 weeks postop doing well, some brusing but normal for course - cont PT, reeval in 4 weeks.\par \par 3/28/23: 6 weeks postop doing very well.  Cont PT.

## 2023-04-17 NOTE — OCCUPATIONAL THERAPY INITIAL EVALUATION ADULT - TRANSFER TRAINING, PT EVAL
patient takes metformin and vitamin d/no
Pt will transfer to all surfaces, safely and independently with the least restrictive adaptive device within 2 -4 weeks.

## 2023-04-20 ENCOUNTER — APPOINTMENT (OUTPATIENT)
Dept: PAIN MANAGEMENT | Facility: CLINIC | Age: 73
End: 2023-04-20
Payer: OTHER MISCELLANEOUS

## 2023-04-20 PROCEDURE — 99213 OFFICE O/P EST LOW 20 MIN: CPT

## 2023-04-20 NOTE — HISTORY OF PRESENT ILLNESS
[Neck] : neck [Lower back] : lower back [Work related] : work related [8] : 8 [7] : 7 [Dull/Aching] : dull/aching [Sharp] : sharp [Shooting] : shooting [Frequent] : frequent [Household chores] : household chores [Social interactions] : social interactions [Rest] : rest [Meds] : meds [Walking] : walking [Bending forward] : bending forward [Exercising] : exercising [Stairs] : stairs [] : yes [FreeTextEntry1] : shoulders, knees [FreeTextEntry3] : 1/16/01 [FreeTextEntry7] : left ankle/knees [de-identified] : lifting

## 2023-04-24 ENCOUNTER — RX RENEWAL (OUTPATIENT)
Age: 73
End: 2023-04-24

## 2023-04-25 ENCOUNTER — APPOINTMENT (OUTPATIENT)
Dept: ORTHOPEDIC SURGERY | Facility: CLINIC | Age: 73
End: 2023-04-25
Payer: OTHER MISCELLANEOUS

## 2023-04-25 VITALS — HEIGHT: 66 IN | WEIGHT: 234 LBS | BODY MASS INDEX: 37.61 KG/M2

## 2023-04-25 PROCEDURE — 99024 POSTOP FOLLOW-UP VISIT: CPT

## 2023-04-25 NOTE — DISCUSSION/SUMMARY
[de-identified] : The patient was advised of the diagnosis.  The natural history of the pathology was explained in full to the patient in layman's terms. All questions were answered.  The risks and benefits of surgical and non-surgical treatment alternatives were explained in full to the patient.\par

## 2023-04-25 NOTE — ASSESSMENT
[FreeTextEntry1] : Previous doc:\par Mod/adv OA left knee, prior tib plateau fx. Progression on degen on XR over the psat 3 years. Cortisone inj today and work on weight loss to get BMI < 40.\par 6/21/19: Continue activities as tolerated and HEP. He has to get vertigo under control. Will consider cortisone at next appt.\par 9/13/19: Cont pain in left knee - tolerating it with home exercises. Working on weight loss and vertigo control. Uses cane for balance/vertigo.\par 12/13 knee is stable no sig changes - HEP - uising cane for balance - inj left shoulder today did help him in the past\par 4/22/20:Steroid inj helped in the past but want to wait until this passes but stable rights now - Vertigo is stable with occ episodes - Left shoulder inj helped a lot rarely has pain\par 6/2/20: Mult falls secondary to episodes of vertigo. Not much pain today so will hold on injections. Concerned about his ongoing vertigo causing mult falls and leading to further injury - needs to keep following this with PCP although he has been told it will get progressively worse over time and has no good short term solution. Had neg neuro and ENT eval in the past. Left shoulder repeat bursa inj today. - has been falling a lot due to knee and balance - has rib contusion and I feel he will benefit from lidocaine patches\par 9/4/20: Overall no change - tolerating pain well but has episodes of flareups after falls from vertigo. Discussed inj when pain is severe but for now will cont home exercises.\par 12/18/20: Stable since last visit. Seems do be doing ok with home exercises so will cont this for now and reeval in 3 months.\par 8/23/21: Hematoma throughout right calf - no signs of DVT. Recc cont with ice and elevation and restart PT when ab/le to work on quad strength which was likely cause for his knees giving out.\par 10/1 swelling and ecchimosis resolving but still some knee pain - unchanged and stable - shoulder is stable since the injection - he is currently permanent out of work\par 7/19/22: \par 3/29/22: Has falls every so often due to vertigo. Knees have pain when these episodes occur however today he is tolerating the pain well and declined injections.\par 6/21/22: Discussed TKA, r/b/a, and preop/postop periods in detail. LEft knee worse than the rt knee - planning for TKA in the fall - we will plan robotics get Knee CT scan to eval bone loss   \par 7/19/22: Cont pain, proceed with TKA when authorized.  CT was done.\par 10/25/22: still awaiting WC auth as pain is worsening as well as worsening right knee pain. FU in 4 weeks. plan to speak with  and EC department \par 11/22/22: Worsening pain in left knee. He is still pending surgical auth. again review surgery and post op coarse \par 2/28/23: 2 weeks postop doing well, some brusing but normal for course - cont PT, reeval in 4 weeks.\par 3/28/23: 6 weeks postop doing very well.  Cont PT.\par \par 4/25/23: Doing very well, cont HEP, return at 1 year postop.

## 2023-04-25 NOTE — HISTORY OF PRESENT ILLNESS
[Work related] : work related [3] : 3 [0] : 0 [Localized] : localized [Tightness] : tightness [Walking/activity] : walking/activity [Physical therapy] : physical therapy [Retired] : Work status: retired [de-identified] : 4/25/23: 10 weeks postop no pain.\par \par Previous doc:\par Left knee injury in 2016 sustained lateral tibial plateau fx treated nonsurgically. Prior to that had arthroscopy 2008. Worsening pain over the past 3 years. PT in the past helped a little but had difficulty with this due to vertigo. Cortisone inj in the past helped. CVA 2017 now takes aspirin - weakness only in right pinky finger.\par 6/21/19: Symptoms remain the same. Injection helped temporary. Has good and bad days with knee depending on severity of vertigo.\par 9/13/19: Cont pain in left knee, no change overall.\par 12/13/19: Cont pain in knees but tolerable. Does home exercises. Left shoulder worsening x 3 months which is part of his WC case. Had inj in Feb which helped for several months - MRI in the past with partial cuff tearing. Pain wakes him up at night.\par 4/22/20: he has reji doing min due to COVId he is in some pain but tolerable\par 6/2/20: Mult falls from vertigo recently. Has knee pain only at the time of the fall and then resolves. Min pain today.\par 9/4/20: Had good relief from shoulder inj last time. Mult falls with vertigo, no change overall. Pain is daily but tolerable at this time.\par 12/18/20: No significant change since last time. has been doing home exercises which help.\par 8/23/21: Legs gave out while on the beach, fell and has pain and swelling in right leg. Seen at urgent care last week, had neg XRs.\par 10/1 overall most of the calf pain as swelling has resolved\par 3/29/22: Continued pain in b/l knees. Had another fall last month due to vertigo.\par 6/21/22: He returns with increasing pain in bilateral knees with increasing feeling of instability in his L knee. Pain is now affecting his ADLs\par 7/19/22: Cont pain, waiting for TKA auth.\par 10/25/22: continued pain in knees - is waiting for a surgery auth\par 11/22/22: Worsening pain in knee. He is still pending surgical auth.   \par 2/28/23: 2 weeks s/p left TKA - pain since last night, caught himself from falling during vertigo episode and hit knee against wall.  Prior to this was doing very well min pain.\par 3/28/23: 6 weeks postop min pain.  PT going well.  No fevers/chills. [] : no [FreeTextEntry1] : L knee [FreeTextEntry3] : 1/16/2001 [FreeTextEntry5] : ED L knee. Pt states significant improvement since last visit. Pt states he has been complaint w PT and home exercises. \par LAXMI DOI: 1/16/2001 [de-identified] : 2/15/2023 [de-identified] : PT \par home exercises

## 2023-07-19 ENCOUNTER — APPOINTMENT (OUTPATIENT)
Dept: PAIN MANAGEMENT | Facility: CLINIC | Age: 73
End: 2023-07-19
Payer: OTHER MISCELLANEOUS

## 2023-07-19 VITALS — BODY MASS INDEX: 37.61 KG/M2 | WEIGHT: 234 LBS | HEIGHT: 66 IN

## 2023-07-19 PROCEDURE — 99213 OFFICE O/P EST LOW 20 MIN: CPT

## 2023-07-19 NOTE — HISTORY OF PRESENT ILLNESS
[Neck] : neck [Work related] : work related [8] : 8 [7] : 7 [Dull/Aching] : dull/aching [Sharp] : sharp [Shooting] : shooting [Frequent] : frequent [Household chores] : household chores [Social interactions] : social interactions [Rest] : rest [Meds] : meds [Walking] : walking [Bending forward] : bending forward [Exercising] : exercising [Stairs] : stairs [] : yes [FreeTextEntry1] : shoulders, knees [FreeTextEntry3] : 01/16/2001 [FreeTextEntry7] : left ankle/knees [de-identified] : lifting

## 2023-07-20 ENCOUNTER — RX RENEWAL (OUTPATIENT)
Age: 73
End: 2023-07-20

## 2023-07-25 ENCOUNTER — APPOINTMENT (OUTPATIENT)
Dept: ORTHOPEDIC SURGERY | Facility: CLINIC | Age: 73
End: 2023-07-25
Payer: OTHER MISCELLANEOUS

## 2023-07-25 VITALS — WEIGHT: 234 LBS | HEIGHT: 66 IN | BODY MASS INDEX: 37.61 KG/M2

## 2023-07-25 PROCEDURE — L1833: CPT | Mod: LT

## 2023-07-25 PROCEDURE — 99214 OFFICE O/P EST MOD 30 MIN: CPT

## 2023-07-25 PROCEDURE — 73562 X-RAY EXAM OF KNEE 3: CPT | Mod: LT

## 2023-07-25 NOTE — HISTORY OF PRESENT ILLNESS
[9] : 9 [4] : 4 [Dull/Aching] : dull/aching [Radiating] : radiating [Stabbing] : stabbing [Constant] : constant [Walking] : walking [Stairs] : stairs [Not working due to injury] : Work status: not working due to injury [] : yes [de-identified] : 7/25/23:6 months s/p L TKA. Pt was doing well and doing daily activities with min pain . Pt states he was having difficulty balancing and fell 2 days ago twisting left knee- experiencing some anterior and lateral pain. \par \par Previous doc:\par Left knee injury in 2016 sustained lateral tibial plateau fx treated nonsurgically. Prior to that had arthroscopy 2008. Worsening pain over the past 3 years. PT in the past helped a little but had difficulty with this due to vertigo. Cortisone inj in the past helped. CVA 2017 now takes aspirin - weakness only in right pinky finger.\par 6/21/19: Symptoms remain the same. Injection helped temporary. Has good and bad days with knee depending on severity of vertigo.\par 9/13/19: Cont pain in left knee, no change overall.\par 12/13/19: Cont pain in knees but tolerable. Does home exercises. Left shoulder worsening x 3 months which is part of his WC case. Had inj in Feb which helped for several months - MRI in the past with partial cuff tearing. Pain wakes him up at night.\par 4/22/20: he has reji doing min due to COVId he is in some pain but tolerable\par 6/2/20: Mult falls from vertigo recently. Has knee pain only at the time of the fall and then resolves. Min pain today.\par 9/4/20: Had good relief from shoulder inj last time. Mult falls with vertigo, no change overall. Pain is daily but tolerable at this time.\par 12/18/20: No significant change since last time. has been doing home exercises which help.\par 8/23/21: Legs gave out while on the beach, fell and has pain and swelling in right leg. Seen at urgent care last week, had neg XRs.\par 10/1 overall most of the calf pain as swelling has resolved\par 3/29/22: Continued pain in b/l knees. Had another fall last month due to vertigo.\par 6/21/22: He returns with increasing pain in bilateral knees with increasing feeling of instability in his L knee. Pain is now affecting his ADLs\par 7/19/22: Cont pain, waiting for TKA auth.\par 10/25/22: continued pain in knees - is waiting for a surgery auth\par 11/22/22: Worsening pain in knee. He is still pending surgical auth.   \par 2/28/23: 2 weeks s/p left TKA - pain since last night, caught himself from falling during vertigo episode and hit knee against wall.  Prior to this was doing very well min pain.\par 3/28/23: 6 weeks postop min pain.  PT going well.  No fevers/chills.\par 4/25/23: 10 weeks postop no pain. [FreeTextEntry1] : L knee [FreeTextEntry3] : 1/16/2001 [FreeTextEntry5] : Pt is here for follow up on L knee. States he has been feeling slightly worse since last visit. States he has trouble performing daily activities [FreeTextEntry7] : L hip [de-identified] :

## 2023-07-25 NOTE — ASSESSMENT
[FreeTextEntry1] : Previous doc:\par Mod/adv OA left knee, prior tib plateau fx. Progression on degen on XR over the psat 3 years. Cortisone inj today and work on weight loss to get BMI < 40.\par 6/21/19: Continue activities as tolerated and HEP. He has to get vertigo under control. Will consider cortisone at next appt.\par 9/13/19: Cont pain in left knee - tolerating it with home exercises. Working on weight loss and vertigo control. Uses cane for balance/vertigo.\par 12/13 knee is stable no sig changes - HEP - uising cane for balance - inj left shoulder today did help him in the past\par 4/22/20:Steroid inj helped in the past but want to wait until this passes but stable rights now - Vertigo is stable with occ episodes - Left shoulder inj helped a lot rarely has pain\par 6/2/20: Mult falls secondary to episodes of vertigo. Not much pain today so will hold on injections. Concerned about his ongoing vertigo causing mult falls and leading to further injury - needs to keep following this with PCP although he has been told it will get progressively worse over time and has no good short term solution. Had neg neuro and ENT eval in the past. Left shoulder repeat bursa inj today. - has been falling a lot due to knee and balance - has rib contusion and I feel he will benefit from lidocaine patches\par 9/4/20: Overall no change - tolerating pain well but has episodes of flareups after falls from vertigo. Discussed inj when pain is severe but for now will cont home exercises.\par 12/18/20: Stable since last visit. Seems do be doing ok with home exercises so will cont this for now and reeval in 3 months.\par 8/23/21: Hematoma throughout right calf - no signs of DVT. Recc cont with ice and elevation and restart PT when ab/le to work on quad strength which was likely cause for his knees giving out.\par 10/1 swelling and ecchimosis resolving but still some knee pain - unchanged and stable - shoulder is stable since the injection - he is currently permanent out of work\par 7/19/22: \par 3/29/22: Has falls every so often due to vertigo. Knees have pain when these episodes occur however today he is tolerating the pain well and declined injections.\par 6/21/22: Discussed TKA, r/b/a, and preop/postop periods in detail. LEft knee worse than the rt knee - planning for TKA in the fall - we will plan robotics get Knee CT scan to eval bone loss   \par 7/19/22: Cont pain, proceed with TKA when authorized.  CT was done.\par 10/25/22: still awaiting WC auth as pain is worsening as well as worsening right knee pain. FU in 4 weeks. plan to speak with  and EC department \par 11/22/22: Worsening pain in left knee. He is still pending surgical auth. again review surgery and post op coarse \par 2/28/23: 2 weeks postop doing well, some brusing but normal for course - cont PT, reeval in 4 weeks.\par 3/28/23: 6 weeks postop doing very well.  Cont PT.\par 4/25/23: Doing very well, cont HEP, return at 1 year postop.\par \par 7/25/23: No sign of instability mild effusion but no other sign of infection cute change after traumatic injury most likely a knee sprain. Hinge knee brace, rest ice antiinflam as needed. Re eval in 2 weeks.

## 2023-07-25 NOTE — DISCUSSION/SUMMARY
[de-identified] : The patient was advised of the diagnosis.  The natural history of the pathology was explained in full to the patient in layman's terms. All questions were answered.  The risks and benefits of surgical and non-surgical treatment alternatives were explained in full to the patient.\par \par Entered by Fern Lucio acting as a scribe.\par \par

## 2023-07-25 NOTE — PHYSICAL EXAM
[de-identified] : Left knee:   \par mild effusion\par lateral tenderness\par medial tenderness\par ROM 0-120.  \par Lig stable.  \par NVI.  \par Walks without assistance.

## 2023-07-25 NOTE — IMAGING
[Left] : left knee [All Views] : anteroposterior, lateral, skyline, and anteroposterior standing [There are no fractures, subluxations or dislocations. No significant abnormalities are seen] : There are no fractures, subluxations or dislocations. No significant abnormalities are seen [Components well fixed, in good position] : Components well fixed, in good position [Tunnels and hardware in proper position] : Tunnels and hardware in proper position

## 2023-08-08 ENCOUNTER — APPOINTMENT (OUTPATIENT)
Dept: ORTHOPEDIC SURGERY | Facility: CLINIC | Age: 73
End: 2023-08-08
Payer: OTHER MISCELLANEOUS

## 2023-08-08 ENCOUNTER — RESULT CHARGE (OUTPATIENT)
Age: 73
End: 2023-08-08

## 2023-08-08 VITALS — HEIGHT: 66 IN | BODY MASS INDEX: 37.61 KG/M2 | WEIGHT: 234 LBS

## 2023-08-08 DIAGNOSIS — Z96.652 PRESENCE OF LEFT ARTIFICIAL KNEE JOINT: ICD-10-CM

## 2023-08-08 DIAGNOSIS — M17.12 UNILATERAL PRIMARY OSTEOARTHRITIS, LEFT KNEE: ICD-10-CM

## 2023-08-08 PROCEDURE — 99214 OFFICE O/P EST MOD 30 MIN: CPT

## 2023-08-08 PROCEDURE — 73562 X-RAY EXAM OF KNEE 3: CPT | Mod: RT

## 2023-08-08 NOTE — DISCUSSION/SUMMARY
[de-identified] : The patient was advised of the diagnosis.  The natural history of the pathology was explained in full to the patient in layman's terms. All questions were answered.  The risks and benefits of surgical and non-surgical treatment alternatives were explained in full to the patient.

## 2023-08-08 NOTE — HISTORY OF PRESENT ILLNESS
[Work related] : work related [9] : 9 [6] : 6 [Dull/Aching] : dull/aching [de-identified] : 8/8/23: Min left knee pain but concerned about difficulty with balance.  Unsure if right knee is contributing to this as it gives out sometimes.  Previous doc: Left knee injury in 2016 sustained lateral tibial plateau fx treated nonsurgically. Prior to that had arthroscopy 2008. Worsening pain over the past 3 years. PT in the past helped a little but had difficulty with this due to vertigo. Cortisone inj in the past helped. CVA 2017 now takes aspirin - weakness only in right pinky finger. 6/21/19: Symptoms remain the same. Injection helped temporary. Has good and bad days with knee depending on severity of vertigo. 9/13/19: Cont pain in left knee, no change overall. 12/13/19: Cont pain in knees but tolerable. Does home exercises. Left shoulder worsening x 3 months which is part of his WC case. Had inj in Feb which helped for several months - MRI in the past with partial cuff tearing. Pain wakes him up at night. 4/22/20: he has reji doing min due to COVId he is in some pain but tolerable 6/2/20: Mult falls from vertigo recently. Has knee pain only at the time of the fall and then resolves. Min pain today. 9/4/20: Had good relief from shoulder inj last time. Mult falls with vertigo, no change overall. Pain is daily but tolerable at this time. 12/18/20: No significant change since last time. has been doing home exercises which help. 8/23/21: Legs gave out while on the beach, fell and has pain and swelling in right leg. Seen at urgent care last week, had neg XRs. 10/1 overall most of the calf pain as swelling has resolved 3/29/22: Continued pain in b/l knees. Had another fall last month due to vertigo. 6/21/22: He returns with increasing pain in bilateral knees with increasing feeling of instability in his L knee. Pain is now affecting his ADLs 7/19/22: Cont pain, waiting for TKA auth. 10/25/22: continued pain in knees - is waiting for a surgery auth 11/22/22: Worsening pain in knee. He is still pending surgical auth.    2/28/23: 2 weeks s/p left TKA - pain since last night, caught himself from falling during vertigo episode and hit knee against wall.  Prior to this was doing very well min pain. 3/28/23: 6 weeks postop min pain.  PT going well.  No fevers/chills. 4/25/23: 10 weeks postop no pain. 7/25/23:6 months s/p L TKA. Pt was doing well and doing daily activities with min pain . Pt states he was having difficulty balancing and fell 2 days ago twisting left knee- experiencing some anterior and lateral pain.

## 2023-08-08 NOTE — ASSESSMENT
[FreeTextEntry1] : Previous doc: Mod/adv OA left knee, prior tib plateau fx. Progression on degen on XR over the psat 3 years. Cortisone inj today and work on weight loss to get BMI < 40. 6/21/19: Continue activities as tolerated and HEP. He has to get vertigo under control. Will consider cortisone at next appt. 9/13/19: Cont pain in left knee - tolerating it with home exercises. Working on weight loss and vertigo control. Uses cane for balance/vertigo. 12/13 knee is stable no sig changes - HEP - uising cane for balance - inj left shoulder today did help him in the past 4/22/20:Steroid inj helped in the past but want to wait until this passes but stable rights now - Vertigo is stable with occ episodes - Left shoulder inj helped a lot rarely has pain 6/2/20: Mult falls secondary to episodes of vertigo. Not much pain today so will hold on injections. Concerned about his ongoing vertigo causing mult falls and leading to further injury - needs to keep following this with PCP although he has been told it will get progressively worse over time and has no good short term solution. Had neg neuro and ENT eval in the past. Left shoulder repeat bursa inj today. - has been falling a lot due to knee and balance - has rib contusion and I feel he will benefit from lidocaine patches 9/4/20: Overall no change - tolerating pain well but has episodes of flareups after falls from vertigo. Discussed inj when pain is severe but for now will cont home exercises. 12/18/20: Stable since last visit. Seems do be doing ok with home exercises so will cont this for now and reeval in 3 months. 8/23/21: Hematoma throughout right calf - no signs of DVT. Recc cont with ice and elevation and restart PT when ab/le to work on quad strength which was likely cause for his knees giving out. 10/1 swelling and ecchimosis resolving but still some knee pain - unchanged and stable - shoulder is stable since the injection - he is currently permanent out of work 7/19/22:  3/29/22: Has falls every so often due to vertigo. Knees have pain when these episodes occur however today he is tolerating the pain well and declined injections. 6/21/22: Discussed TKA, r/b/a, and preop/postop periods in detail. LEft knee worse than the rt knee - planning for TKA in the fall - we will plan robotics get Knee CT scan to eval bone loss    7/19/22: Cont pain, proceed with TKA when authorized.  CT was done. 10/25/22: still awaiting WC auth as pain is worsening as well as worsening right knee pain. FU in 4 weeks. plan to speak with  and EC department  11/22/22: Worsening pain in left knee. He is still pending surgical auth. again review surgery and post op coarse  2/28/23: 2 weeks postop doing well, some brusing but normal for course - cont PT, reeval in 4 weeks. 3/28/23: 6 weeks postop doing very well.  Cont PT. 4/25/23: Doing very well, cont HEP, return at 1 year postop. 7/25/23: No sign of instability mild effusion but no other sign of infection cute change after traumatic injury most likely a knee sprain. Hinge knee brace, rest ice antiinflam as needed. Re eval in 2 weeks.   8/8/23: Left knee improved since last time.  He is concerned about right knee and his balance.  XRs today with mild OA - will get MRI right knee eval MMT.

## 2023-08-08 NOTE — IMAGING
[de-identified] : Left knee:    No effusion No tenderness ROM 0-120.   Lig stable.   NVI.   Walks without assistance.  Right knee: Effusion.  Medial tenderness.  ROM 0-115. [AP] : anteroposterior [Lateral] : lateral [[place]] : skyline [Mild tricompartmental OA medial narrowing] : Mild tricompartmental OA medial narrowing

## 2023-08-10 ENCOUNTER — APPOINTMENT (OUTPATIENT)
Dept: MRI IMAGING | Facility: CLINIC | Age: 73
End: 2023-08-10
Payer: OTHER MISCELLANEOUS

## 2023-08-10 PROCEDURE — 73721 MRI JNT OF LWR EXTRE W/O DYE: CPT | Mod: RT

## 2023-08-17 ENCOUNTER — RX RENEWAL (OUTPATIENT)
Age: 73
End: 2023-08-17

## 2023-08-18 ENCOUNTER — APPOINTMENT (OUTPATIENT)
Dept: ORTHOPEDIC SURGERY | Facility: CLINIC | Age: 73
End: 2023-08-18
Payer: OTHER MISCELLANEOUS

## 2023-08-18 VITALS — WEIGHT: 234 LBS | HEIGHT: 66 IN | BODY MASS INDEX: 37.61 KG/M2

## 2023-08-18 PROCEDURE — 99215 OFFICE O/P EST HI 40 MIN: CPT

## 2023-08-18 NOTE — REASON FOR VISIT
Pt tolerating diet well, voiding to urinal.  Reports flank pain 5/10 on pain scale; declines medication.  Wife at bedside.  Asst pt with repositioning; pt up with PT/OT.  Social work working on placing pt.    Bed in low locked position, call light within reach, able to make needs known, no needs at this time.   [FreeTextEntry2] : WC Follow up left knee

## 2023-08-18 NOTE — ASSESSMENT
[FreeTextEntry1] : Previous doc: Mod/adv OA left knee, prior tib plateau fx. Progression on degen on XR over the psat 3 years. Cortisone inj today and work on weight loss to get BMI < 40. 6/21/19: Continue activities as tolerated and HEP. He has to get vertigo under control. Will consider cortisone at next appt. 9/13/19: Cont pain in left knee - tolerating it with home exercises. Working on weight loss and vertigo control. Uses cane for balance/vertigo. 12/13 knee is stable no sig changes - HEP - uising cane for balance - inj left shoulder today did help him in the past 4/22/20:Steroid inj helped in the past but want to wait until this passes but stable rights now - Vertigo is stable with occ episodes - Left shoulder inj helped a lot rarely has pain 6/2/20: Mult falls secondary to episodes of vertigo. Not much pain today so will hold on injections. Concerned about his ongoing vertigo causing mult falls and leading to further injury - needs to keep following this with PCP although he has been told it will get progressively worse over time and has no good short term solution. Had neg neuro and ENT eval in the past. Left shoulder repeat bursa inj today. - has been falling a lot due to knee and balance - has rib contusion and I feel he will benefit from lidocaine patches 9/4/20: Overall no change - tolerating pain well but has episodes of flareups after falls from vertigo. Discussed inj when pain is severe but for now will cont home exercises. 12/18/20: Stable since last visit. Seems do be doing ok with home exercises so will cont this for now and reeval in 3 months. 8/23/21: Hematoma throughout right calf - no signs of DVT. Recc cont with ice and elevation and restart PT when ab/le to work on quad strength which was likely cause for his knees giving out. 10/1 swelling and ecchimosis resolving but still some knee pain - unchanged and stable - shoulder is stable since the injection - he is currently permanent out of work 7/19/22:  3/29/22: Has falls every so often due to vertigo. Knees have pain when these episodes occur however today he is tolerating the pain well and declined injections. 6/21/22: Discussed TKA, r/b/a, and preop/postop periods in detail. LEft knee worse than the rt knee - planning for TKA in the fall - we will plan robotics get Knee CT scan to eval bone loss    7/19/22: Cont pain, proceed with TKA when authorized.  CT was done. 10/25/22: still awaiting WC auth as pain is worsening as well as worsening right knee pain. FU in 4 weeks. plan to speak with  and EC department  11/22/22: Worsening pain in left knee. He is still pending surgical auth. again review surgery and post op coarse  2/28/23: 2 weeks postop doing well, some brusing but normal for course - cont PT, reeval in 4 weeks. 3/28/23: 6 weeks postop doing very well.  Cont PT. 4/25/23: Doing very well, cont HEP, return at 1 year postop. 7/25/23: No sign of instability mild effusion but no other sign of infection cute change after traumatic injury most likely a knee sprain. Hinge knee brace, rest ice antiinflam as needed. Re eval in 2 weeks.  8/8/23: Left knee improved since last time.  He is concerned about right knee and his balance.  XRs today with mild OA - will get MRI right knee eval MMT.  8/18/23: MRI reviewed - has MMT/LMT but areas of singificant OA - offered scope vs TKA and he wishes to proceed with right TKA.  Need CT right knee eval bone loss.

## 2023-08-18 NOTE — IMAGING
[AP] : anteroposterior [Lateral] : lateral [[place]] : skyline [Mild tricompartmental OA medial narrowing] : Mild tricompartmental OA medial narrowing [de-identified] : Left knee:    No effusion No tenderness ROM 0-120.   Lig stable.   NVI.   Walks without assistance.  Right knee: Effusion.  Medial tenderness.  ROM 0-115.

## 2023-08-18 NOTE — END OF VISIT
[FreeTextEntry3] : I Dr. Siddiqui, personally performed the evaluation and management services for this established patient who present today with a new problem/exacerbation of an existing condition. The E/M includes conducting the examination, assessing all new/exacerbated conditions, and establishing a new plan of care. Today, my BENJI, Eddi Couch, was here to observe in my evaluation and management services of this new problem/exacerbated condition to be followed going forward.

## 2023-08-18 NOTE — DISCUSSION/SUMMARY
[de-identified] : The natural progression of Osteoarthritis was explained to the patient.  We discussed the possible treatment options from conservative to operative.  These included NSAIDS, Glucosamine and Chondrotin sulfate, and Physical Therapy as well different types of injections.  We also discussed that at some point they may progress to needed a TKA.  Information and pamphlets were given when appropriate.  Patient Complains of pain in Knee with a level that often reaches greater than a 8/10. The Pain has been progressively worsening of his/her treatment coarse. The pain has interfered with their ADLs and worsens with weight bearing. On exam they often have episodes of swelling/effusion with limited ROM. Pain worsens with ROM passive and active and I can palpate crepitus.   X rays were reviewed with the patient and they show joint space narrowing, subchondral sclerosis, osteophyte formation, and subchondral cysts.  After a period of more than 12 weeks physical therapy or exercise program done with me or another treating physician they have continued pain. The patient has failed a trial of NSAID medication or pain relieves if they were unable to tolerate NSAID medications as well as a series of injection, steroid or Hyaluronic Acid. After a long discussion with the patient both the patient and I have decided we have exhausted all forms of less radical treatments and they would like to proceed with Total Knee Replacement  We discussed my findings and the natural history of their condition.  We talked about the details of the proposed surgery and the recovery.  We discussed the material risks, possible benefits and alternatives to surgery.  The risks include but are not limited to infection, bleeding and possible need for blood transfusion, fracture, bowel blockage, bladder retention or infection, need for reoperation, stiffness and/or limited range of motion, possible damage to nerves and blood vessels, failure of fixation of components, risk of deep vein thromboses and pulmonary embolism, wound healing problems, dislocation, and possible leg length discrepancy.  Although incredibly rare, we also discussed the risks of a cardiac event, stroke and even death during, or following, the surgery.  We discussed the type of implants the patient will be receiving and the type of fixation that will be used, as well as whether a robot or computer navigation aide will be used.  The patient understands they will need medical clearance and will attend a preoperative joint education class.  We also discussed the type of anesthesia they will receive, and the risks associated with hospital or rehab length of stay, obesity, diabetes and smoking.

## 2023-08-18 NOTE — HISTORY OF PRESENT ILLNESS
[6] : 6 [4] : 4 [Dull/Aching] : dull/aching [de-identified] : 8/8/23: Min left knee pain but concerned about difficulty with balance.  Unsure if right knee is contributing to this as it gives out sometimes.  Previous doc: Left knee injury in 2016 sustained lateral tibial plateau fx treated nonsurgically. Prior to that had arthroscopy 2008. Worsening pain over the past 3 years. PT in the past helped a little but had difficulty with this due to vertigo. Cortisone inj in the past helped. CVA 2017 now takes aspirin - weakness only in right pinky finger. 6/21/19: Symptoms remain the same. Injection helped temporary. Has good and bad days with knee depending on severity of vertigo. 9/13/19: Cont pain in left knee, no change overall. 12/13/19: Cont pain in knees but tolerable. Does home exercises. Left shoulder worsening x 3 months which is part of his WC case. Had inj in Feb which helped for several months - MRI in the past with partial cuff tearing. Pain wakes him up at night. 4/22/20: he has reji doing min due to COVId he is in some pain but tolerable 6/2/20: Mult falls from vertigo recently. Has knee pain only at the time of the fall and then resolves. Min pain today. 9/4/20: Had good relief from shoulder inj last time. Mult falls with vertigo, no change overall. Pain is daily but tolerable at this time. 12/18/20: No significant change since last time. has been doing home exercises which help. 8/23/21: Legs gave out while [place] the beach, fell and has pain and swelling in right leg. Seen at urgent care last week, had neg XRs. 10/1 overall most of the calf pain as swelling has resolved 3/29/22: Continued pain in b/l knees. Had another fall last month due to vertigo. 6/21/22: He returns with increasing pain in bilateral knees with increasing feeling of instability in his L knee. Pain is now affecting his ADLs 7/19/22: Cont pain, waiting for TKA auth. 10/25/22: continued pain in knees - is waiting for a surgery auth 11/22/22: Worsening pain in knee. He is still pending surgical auth.    2/28/23: 2 weeks s/p left TKA - pain since last night, caught himself from falling during vertigo episode and hit knee against wall.  Prior to this was doing very well min pain. 3/28/23: 6 weeks postop min pain.  PT going well.  No fevers/chills. 4/25/23: 10 weeks postop no pain. 7/25/23:6 months s/p L TKA. Pt was doing well and doing daily activities with min pain . Pt states he was having difficulty balancing and fell 2 days ago twisting left knee- experiencing some anterior and lateral pain.

## 2023-08-24 ENCOUNTER — APPOINTMENT (OUTPATIENT)
Dept: CT IMAGING | Facility: CLINIC | Age: 73
End: 2023-08-24
Payer: OTHER MISCELLANEOUS

## 2023-08-24 PROCEDURE — 73700 CT LOWER EXTREMITY W/O DYE: CPT | Mod: RT

## 2023-09-12 ENCOUNTER — APPOINTMENT (OUTPATIENT)
Dept: ORTHOPEDIC SURGERY | Facility: CLINIC | Age: 73
End: 2023-09-12
Payer: OTHER MISCELLANEOUS

## 2023-09-12 VITALS — WEIGHT: 234 LBS | HEIGHT: 66 IN | BODY MASS INDEX: 37.61 KG/M2

## 2023-09-12 DIAGNOSIS — M17.11 UNILATERAL PRIMARY OSTEOARTHRITIS, RIGHT KNEE: ICD-10-CM

## 2023-09-12 PROCEDURE — 99213 OFFICE O/P EST LOW 20 MIN: CPT

## 2023-09-15 ENCOUNTER — RX RENEWAL (OUTPATIENT)
Age: 73
End: 2023-09-15

## 2023-10-12 ENCOUNTER — RX RENEWAL (OUTPATIENT)
Age: 73
End: 2023-10-12

## 2023-10-16 ENCOUNTER — APPOINTMENT (OUTPATIENT)
Dept: PAIN MANAGEMENT | Facility: CLINIC | Age: 73
End: 2023-10-16
Payer: OTHER MISCELLANEOUS

## 2023-10-16 PROCEDURE — 99213 OFFICE O/P EST LOW 20 MIN: CPT

## 2023-11-09 ENCOUNTER — RX RENEWAL (OUTPATIENT)
Age: 73
End: 2023-11-09

## 2023-12-06 NOTE — H&P PST ADULT - MS GEN HX ROS MEA POS PC
Fwd to Dr. Ferreira for review. Please advise.     Cannot refill sertraline NOT on protocol.     Last refill was 10/6/23 for 30 x 1.      Last office visit was 12/27/21 with Dr. Ferreira    and next is n/a       Has canceled/no showed 4 times since last seen last one was 11/20/23.    arthralgia

## 2023-12-11 ENCOUNTER — RX RENEWAL (OUTPATIENT)
Age: 73
End: 2023-12-11

## 2023-12-14 ENCOUNTER — OUTPATIENT (OUTPATIENT)
Dept: OUTPATIENT SERVICES | Facility: HOSPITAL | Age: 73
LOS: 1 days | Discharge: HOME | End: 2023-12-14
Payer: OTHER MISCELLANEOUS

## 2023-12-14 VITALS
SYSTOLIC BLOOD PRESSURE: 120 MMHG | RESPIRATION RATE: 18 BRPM | HEART RATE: 51 BPM | WEIGHT: 241.63 LBS | TEMPERATURE: 97 F | HEIGHT: 66 IN | DIASTOLIC BLOOD PRESSURE: 75 MMHG | OXYGEN SATURATION: 96 %

## 2023-12-14 DIAGNOSIS — Q21.12 PATENT FORAMEN OVALE: Chronic | ICD-10-CM

## 2023-12-14 DIAGNOSIS — Z01.818 ENCOUNTER FOR OTHER PREPROCEDURAL EXAMINATION: ICD-10-CM

## 2023-12-14 DIAGNOSIS — M17.11 UNILATERAL PRIMARY OSTEOARTHRITIS, RIGHT KNEE: ICD-10-CM

## 2023-12-14 DIAGNOSIS — I63.9 CEREBRAL INFARCTION, UNSPECIFIED: ICD-10-CM

## 2023-12-14 DIAGNOSIS — Q21.12 PATENT FORAMEN OVALE: ICD-10-CM

## 2023-12-14 DIAGNOSIS — Z96.652 PRESENCE OF LEFT ARTIFICIAL KNEE JOINT: Chronic | ICD-10-CM

## 2023-12-14 DIAGNOSIS — Z98.890 OTHER SPECIFIED POSTPROCEDURAL STATES: Chronic | ICD-10-CM

## 2023-12-14 LAB
A1C WITH ESTIMATED AVERAGE GLUCOSE RESULT: 5.4 % — SIGNIFICANT CHANGE UP (ref 4–5.6)
A1C WITH ESTIMATED AVERAGE GLUCOSE RESULT: 5.4 % — SIGNIFICANT CHANGE UP (ref 4–5.6)
ALBUMIN SERPL ELPH-MCNC: 4.2 G/DL — SIGNIFICANT CHANGE UP (ref 3.3–5)
ALBUMIN SERPL ELPH-MCNC: 4.2 G/DL — SIGNIFICANT CHANGE UP (ref 3.3–5)
ALP SERPL-CCNC: 75 U/L — SIGNIFICANT CHANGE UP (ref 40–120)
ALP SERPL-CCNC: 75 U/L — SIGNIFICANT CHANGE UP (ref 40–120)
ALT FLD-CCNC: 30 U/L — SIGNIFICANT CHANGE UP (ref 12–78)
ALT FLD-CCNC: 30 U/L — SIGNIFICANT CHANGE UP (ref 12–78)
ANION GAP SERPL CALC-SCNC: 6 MMOL/L — SIGNIFICANT CHANGE UP (ref 5–17)
ANION GAP SERPL CALC-SCNC: 6 MMOL/L — SIGNIFICANT CHANGE UP (ref 5–17)
APTT BLD: 30.1 SEC — SIGNIFICANT CHANGE UP (ref 24.5–35.6)
APTT BLD: 30.1 SEC — SIGNIFICANT CHANGE UP (ref 24.5–35.6)
AST SERPL-CCNC: 28 U/L — SIGNIFICANT CHANGE UP (ref 15–37)
AST SERPL-CCNC: 28 U/L — SIGNIFICANT CHANGE UP (ref 15–37)
BASOPHILS # BLD AUTO: 0.05 K/UL — SIGNIFICANT CHANGE UP (ref 0–0.2)
BASOPHILS # BLD AUTO: 0.05 K/UL — SIGNIFICANT CHANGE UP (ref 0–0.2)
BASOPHILS NFR BLD AUTO: 0.9 % — SIGNIFICANT CHANGE UP (ref 0–2)
BASOPHILS NFR BLD AUTO: 0.9 % — SIGNIFICANT CHANGE UP (ref 0–2)
BILIRUB SERPL-MCNC: 0.4 MG/DL — SIGNIFICANT CHANGE UP (ref 0.2–1.2)
BILIRUB SERPL-MCNC: 0.4 MG/DL — SIGNIFICANT CHANGE UP (ref 0.2–1.2)
BLD GP AB SCN SERPL QL: SIGNIFICANT CHANGE UP
BLD GP AB SCN SERPL QL: SIGNIFICANT CHANGE UP
BUN SERPL-MCNC: 22 MG/DL — SIGNIFICANT CHANGE UP (ref 7–23)
BUN SERPL-MCNC: 22 MG/DL — SIGNIFICANT CHANGE UP (ref 7–23)
CALCIUM SERPL-MCNC: 9.3 MG/DL — SIGNIFICANT CHANGE UP (ref 8.5–10.1)
CALCIUM SERPL-MCNC: 9.3 MG/DL — SIGNIFICANT CHANGE UP (ref 8.5–10.1)
CHLORIDE SERPL-SCNC: 110 MMOL/L — HIGH (ref 96–108)
CHLORIDE SERPL-SCNC: 110 MMOL/L — HIGH (ref 96–108)
CO2 SERPL-SCNC: 26 MMOL/L — SIGNIFICANT CHANGE UP (ref 22–31)
CO2 SERPL-SCNC: 26 MMOL/L — SIGNIFICANT CHANGE UP (ref 22–31)
CREAT SERPL-MCNC: 1.09 MG/DL — SIGNIFICANT CHANGE UP (ref 0.5–1.3)
CREAT SERPL-MCNC: 1.09 MG/DL — SIGNIFICANT CHANGE UP (ref 0.5–1.3)
EGFR: 72 ML/MIN/1.73M2 — SIGNIFICANT CHANGE UP
EGFR: 72 ML/MIN/1.73M2 — SIGNIFICANT CHANGE UP
EOSINOPHIL # BLD AUTO: 0.22 K/UL — SIGNIFICANT CHANGE UP (ref 0–0.5)
EOSINOPHIL # BLD AUTO: 0.22 K/UL — SIGNIFICANT CHANGE UP (ref 0–0.5)
EOSINOPHIL NFR BLD AUTO: 3.9 % — SIGNIFICANT CHANGE UP (ref 0–6)
EOSINOPHIL NFR BLD AUTO: 3.9 % — SIGNIFICANT CHANGE UP (ref 0–6)
ESTIMATED AVERAGE GLUCOSE: 108 MG/DL — SIGNIFICANT CHANGE UP (ref 68–114)
ESTIMATED AVERAGE GLUCOSE: 108 MG/DL — SIGNIFICANT CHANGE UP (ref 68–114)
GLUCOSE SERPL-MCNC: 117 MG/DL — HIGH (ref 70–99)
GLUCOSE SERPL-MCNC: 117 MG/DL — HIGH (ref 70–99)
HCT VFR BLD CALC: 42.2 % — SIGNIFICANT CHANGE UP (ref 39–50)
HCT VFR BLD CALC: 42.2 % — SIGNIFICANT CHANGE UP (ref 39–50)
HGB BLD-MCNC: 14.1 G/DL — SIGNIFICANT CHANGE UP (ref 13–17)
HGB BLD-MCNC: 14.1 G/DL — SIGNIFICANT CHANGE UP (ref 13–17)
IMM GRANULOCYTES NFR BLD AUTO: 0.2 % — SIGNIFICANT CHANGE UP (ref 0–0.9)
IMM GRANULOCYTES NFR BLD AUTO: 0.2 % — SIGNIFICANT CHANGE UP (ref 0–0.9)
INR BLD: 0.84 RATIO — LOW (ref 0.85–1.18)
INR BLD: 0.84 RATIO — LOW (ref 0.85–1.18)
LYMPHOCYTES # BLD AUTO: 1.85 K/UL — SIGNIFICANT CHANGE UP (ref 1–3.3)
LYMPHOCYTES # BLD AUTO: 1.85 K/UL — SIGNIFICANT CHANGE UP (ref 1–3.3)
LYMPHOCYTES # BLD AUTO: 33.2 % — SIGNIFICANT CHANGE UP (ref 13–44)
LYMPHOCYTES # BLD AUTO: 33.2 % — SIGNIFICANT CHANGE UP (ref 13–44)
MCHC RBC-ENTMCNC: 33.4 G/DL — SIGNIFICANT CHANGE UP (ref 32–36)
MCHC RBC-ENTMCNC: 33.4 G/DL — SIGNIFICANT CHANGE UP (ref 32–36)
MCHC RBC-ENTMCNC: 34.1 PG — HIGH (ref 27–34)
MCHC RBC-ENTMCNC: 34.1 PG — HIGH (ref 27–34)
MCV RBC AUTO: 101.9 FL — HIGH (ref 80–100)
MCV RBC AUTO: 101.9 FL — HIGH (ref 80–100)
MONOCYTES # BLD AUTO: 0.5 K/UL — SIGNIFICANT CHANGE UP (ref 0–0.9)
MONOCYTES # BLD AUTO: 0.5 K/UL — SIGNIFICANT CHANGE UP (ref 0–0.9)
MONOCYTES NFR BLD AUTO: 9 % — SIGNIFICANT CHANGE UP (ref 2–14)
MONOCYTES NFR BLD AUTO: 9 % — SIGNIFICANT CHANGE UP (ref 2–14)
MRSA PCR RESULT.: SIGNIFICANT CHANGE UP
MRSA PCR RESULT.: SIGNIFICANT CHANGE UP
NEUTROPHILS # BLD AUTO: 2.95 K/UL — SIGNIFICANT CHANGE UP (ref 1.8–7.4)
NEUTROPHILS # BLD AUTO: 2.95 K/UL — SIGNIFICANT CHANGE UP (ref 1.8–7.4)
NEUTROPHILS NFR BLD AUTO: 52.8 % — SIGNIFICANT CHANGE UP (ref 43–77)
NEUTROPHILS NFR BLD AUTO: 52.8 % — SIGNIFICANT CHANGE UP (ref 43–77)
NRBC # BLD: 0 /100 WBCS — SIGNIFICANT CHANGE UP (ref 0–0)
NRBC # BLD: 0 /100 WBCS — SIGNIFICANT CHANGE UP (ref 0–0)
PLATELET # BLD AUTO: 183 K/UL — SIGNIFICANT CHANGE UP (ref 150–400)
PLATELET # BLD AUTO: 183 K/UL — SIGNIFICANT CHANGE UP (ref 150–400)
POTASSIUM SERPL-MCNC: 4.6 MMOL/L — SIGNIFICANT CHANGE UP (ref 3.5–5.3)
POTASSIUM SERPL-MCNC: 4.6 MMOL/L — SIGNIFICANT CHANGE UP (ref 3.5–5.3)
POTASSIUM SERPL-SCNC: 4.6 MMOL/L — SIGNIFICANT CHANGE UP (ref 3.5–5.3)
POTASSIUM SERPL-SCNC: 4.6 MMOL/L — SIGNIFICANT CHANGE UP (ref 3.5–5.3)
PROT SERPL-MCNC: 7.7 GM/DL — SIGNIFICANT CHANGE UP (ref 6–8.3)
PROT SERPL-MCNC: 7.7 GM/DL — SIGNIFICANT CHANGE UP (ref 6–8.3)
PROTHROM AB SERPL-ACNC: 10.1 SEC — SIGNIFICANT CHANGE UP (ref 9.5–13)
PROTHROM AB SERPL-ACNC: 10.1 SEC — SIGNIFICANT CHANGE UP (ref 9.5–13)
RBC # BLD: 4.14 M/UL — LOW (ref 4.2–5.8)
RBC # BLD: 4.14 M/UL — LOW (ref 4.2–5.8)
RBC # FLD: 13.4 % — SIGNIFICANT CHANGE UP (ref 10.3–14.5)
RBC # FLD: 13.4 % — SIGNIFICANT CHANGE UP (ref 10.3–14.5)
S AUREUS DNA NOSE QL NAA+PROBE: DETECTED
S AUREUS DNA NOSE QL NAA+PROBE: DETECTED
SODIUM SERPL-SCNC: 142 MMOL/L — SIGNIFICANT CHANGE UP (ref 135–145)
SODIUM SERPL-SCNC: 142 MMOL/L — SIGNIFICANT CHANGE UP (ref 135–145)
VIT D25+D1,25 OH+D1,25 PNL SERPL-MCNC: 56 PG/ML — SIGNIFICANT CHANGE UP (ref 19.9–79.3)
VIT D25+D1,25 OH+D1,25 PNL SERPL-MCNC: 56 PG/ML — SIGNIFICANT CHANGE UP (ref 19.9–79.3)
WBC # BLD: 5.58 K/UL — SIGNIFICANT CHANGE UP (ref 3.8–10.5)
WBC # BLD: 5.58 K/UL — SIGNIFICANT CHANGE UP (ref 3.8–10.5)
WBC # FLD AUTO: 5.58 K/UL — SIGNIFICANT CHANGE UP (ref 3.8–10.5)
WBC # FLD AUTO: 5.58 K/UL — SIGNIFICANT CHANGE UP (ref 3.8–10.5)

## 2023-12-14 PROCEDURE — 93010 ELECTROCARDIOGRAM REPORT: CPT

## 2023-12-14 RX ORDER — ICOSAPENT ETHYL 500 MG/1
1 CAPSULE, LIQUID FILLED ORAL
Qty: 0 | Refills: 0 | DISCHARGE

## 2023-12-14 RX ORDER — SODIUM CHLORIDE 9 MG/ML
3 INJECTION INTRAMUSCULAR; INTRAVENOUS; SUBCUTANEOUS EVERY 8 HOURS
Refills: 0 | Status: DISCONTINUED | OUTPATIENT
Start: 2024-01-03 | End: 2024-01-04

## 2023-12-14 RX ORDER — GABAPENTIN 400 MG/1
1 CAPSULE ORAL
Qty: 0 | Refills: 0 | DISCHARGE

## 2023-12-14 RX ORDER — MECLIZINE HCL 12.5 MG
1 TABLET ORAL
Qty: 0 | Refills: 0 | DISCHARGE

## 2023-12-14 RX ORDER — TRAVOPROST 0.04 MG/ML
1 SOLUTION/ DROPS OPHTHALMIC
Qty: 0 | Refills: 0 | DISCHARGE

## 2023-12-14 RX ORDER — ESCITALOPRAM OXALATE 10 MG/1
1 TABLET, FILM COATED ORAL
Qty: 0 | Refills: 0 | DISCHARGE

## 2023-12-14 RX ORDER — ASPIRIN/CALCIUM CARB/MAGNESIUM 324 MG
2 TABLET ORAL
Qty: 0 | Refills: 0 | DISCHARGE

## 2023-12-14 NOTE — H&P PST ADULT - ASSESSMENT
73M  pmh- hld, cva in 2017 - no residual ,glaucoma, depression and anxiety, colon CA (sx) c/o right knee pain 2/2 unilateral primary osteoarthritis here for PsT for scheduled robotic assisted right total nee arthroplasty with Dr. Siddiqui on 1-3-2024  CAPRINI SCORE    AGE RELATED RISK FACTORS                                                       MOBILITY RELATED FACTORS  [ ] Age 41-60 years                                            (1 Point)                  [ ] Bed rest                                                        (1 Point)  [x ] Age: 61-74 years                                           (2 Points)                [ ] Plaster cast                                                   (2 Points)  [ ] Age= 75 years                                              (3 Points)                 [ ] Bed bound for more than 72 hours                   (2 Points)    DISEASE RELATED RISK FACTORS                                               GENDER SPECIFIC FACTORS  [ ] Edema in the lower extremities                       (1 Point)                  [ ] Pregnancy                                                     (1 Point)  [ ] Varicose veins                                               (1 Point)                  [ ] Post-partum < 6 weeks                                   (1 Point)             [x ] BMI > 25 Kg/m2                                            (1 Point)                  [ ] Hormonal therapy  or oral contraception            (1 Point)                 [ ] Sepsis (in the previous month)                        (1 Point)                  [ ] History of pregnancy complications  [ ] Pneumonia or serious lung disease                                               [ ] Unexplained or recurrent                       (1 Point)           (in the previous month)                               (1 Point)  [ ] Abnormal pulmonary function test                     (1 Point)                 SURGERY RELATED RISK FACTORS  [ ] Acute myocardial infarction                              (1 Point)                 [ ]  Section                                            (1 Point)  [ ] Congestive heart failure (in the previous month)  (1 Point)                 [ ] Minor surgery                                                 (1 Point)   [ ] Inflammatory bowel disease                             (1 Point)                 [ ] Arthroscopic surgery                                        (2 Points)  [ ] Central venous access                                    (2 Points)                [ ] General surgery lasting more than 45 minutes   (2 Points)       [ ] Stroke (in the previous month)                          (5 Points)               [x ] Elective arthroplasty                                        (5 Points)                                                                                                                                               HEMATOLOGY RELATED FACTORS                                                 TRAUMA RELATED RISK FACTORS  [ ] Prior episodes of VTE                                     (3 Points)                 [ ] Fracture of the hip, pelvis, or leg                       (5 Points)  [ ] Positive family history for VTE                         (3 Points)                 [ ] Acute spinal cord injury (in the previous month)  (5 Points)  [ ] Prothrombin 46171 A                                      (3 Points)                 [ ] Paralysis  (less than 1 month)                          (5 Points)  [ ] Factor V Leiden                                             (3 Points)                 [ ] Multiple Trauma within 1 month                         (5 Points)  [ ] Lupus anticoagulants                                     (3 Points)                                                           [ ] Anticardiolipin antibodies                                (3 Points)                                                       [ ] High homocysteine in the blood                      (3 Points)                                             [ ] Other congenital or acquired thrombophilia       (3 Points)                                                [ ] Heparin induced thrombocytopenia                  (3 Points)                                          Total Score [      8    ] 73M  pmh- hld, cva in 2017 - no residual ,glaucoma, depression and anxiety, colon CA (sx) c/o right knee pain 2/2 unilateral primary osteoarthritis here for PsT for scheduled robotic assisted right total nee arthroplasty with Dr. Siddiqui on 1-3-2024  CAPRINI SCORE    AGE RELATED RISK FACTORS                                                       MOBILITY RELATED FACTORS  [ ] Age 41-60 years                                            (1 Point)                  [ ] Bed rest                                                        (1 Point)  [x ] Age: 61-74 years                                           (2 Points)                [ ] Plaster cast                                                   (2 Points)  [ ] Age= 75 years                                              (3 Points)                 [ ] Bed bound for more than 72 hours                   (2 Points)    DISEASE RELATED RISK FACTORS                                               GENDER SPECIFIC FACTORS  [ ] Edema in the lower extremities                       (1 Point)                  [ ] Pregnancy                                                     (1 Point)  [ ] Varicose veins                                               (1 Point)                  [ ] Post-partum < 6 weeks                                   (1 Point)             [x ] BMI > 25 Kg/m2                                            (1 Point)                  [ ] Hormonal therapy  or oral contraception            (1 Point)                 [ ] Sepsis (in the previous month)                        (1 Point)                  [ ] History of pregnancy complications  [ ] Pneumonia or serious lung disease                                               [ ] Unexplained or recurrent                       (1 Point)           (in the previous month)                               (1 Point)  [ ] Abnormal pulmonary function test                     (1 Point)                 SURGERY RELATED RISK FACTORS  [ ] Acute myocardial infarction                              (1 Point)                 [ ]  Section                                            (1 Point)  [ ] Congestive heart failure (in the previous month)  (1 Point)                 [ ] Minor surgery                                                 (1 Point)   [ ] Inflammatory bowel disease                             (1 Point)                 [ ] Arthroscopic surgery                                        (2 Points)  [ ] Central venous access                                    (2 Points)                [ ] General surgery lasting more than 45 minutes   (2 Points)       [ ] Stroke (in the previous month)                          (5 Points)               [x ] Elective arthroplasty                                        (5 Points)                                                                                                                                               HEMATOLOGY RELATED FACTORS                                                 TRAUMA RELATED RISK FACTORS  [ ] Prior episodes of VTE                                     (3 Points)                 [ ] Fracture of the hip, pelvis, or leg                       (5 Points)  [ ] Positive family history for VTE                         (3 Points)                 [ ] Acute spinal cord injury (in the previous month)  (5 Points)  [ ] Prothrombin 89521 A                                      (3 Points)                 [ ] Paralysis  (less than 1 month)                          (5 Points)  [ ] Factor V Leiden                                             (3 Points)                 [ ] Multiple Trauma within 1 month                         (5 Points)  [ ] Lupus anticoagulants                                     (3 Points)                                                           [ ] Anticardiolipin antibodies                                (3 Points)                                                       [ ] High homocysteine in the blood                      (3 Points)                                             [ ] Other congenital or acquired thrombophilia       (3 Points)                                                [ ] Heparin induced thrombocytopenia                  (3 Points)                                          Total Score [      8    ]

## 2023-12-14 NOTE — OCCUPATIONAL THERAPY INITIAL EVALUATION ADULT - ADDITIONAL COMMENTS
At  this time, pt is functioning in his roles, self sufficient, driving & ambulating independently at home without any assistive devices, but he use a SAC the community. Pt has an antalgic gait. Pt c/o varying pain in his right knee with highest intensity 8/10. The pain is exacerbated, by walking, prolonged standing, negotiating steps and is relieved with Gabapentin or cyclodiene. Pt is right hand dominant and wears glasses  all the time.

## 2023-12-14 NOTE — OCCUPATIONAL THERAPY INITIAL EVALUATION ADULT - PERTINENT HX OF CURRENT PROBLEM, REHAB EVAL
Pt is a 71 y/o slated for elective surgery for right TKR with MD Siddiqui on 1/3/2024, due to work related injury, OA, pain and DJD. Pt reported buckling in his  right knee, but denied any falls in the past 3-6 months. Pt ha sh/o vertigo and for this he uses a SAC due to frequent loss of balance. Pt is a 73 y/o slated for elective surgery for right TKR with MD Siddiqui on 1/3/2024, due to work related injury, OA, pain and DJD. Pt reported buckling in his  right knee, but denied any falls in the past 3-6 months. Pt had left TKR on 2/15/2023 and was discharged home with rehab services.  Pt has h/o vertigo and for this reason, he uses a SAC due to frequent loss of balance. Pt is a 71 y/o slated for elective surgery for right TKR with MD Siddiqui on 1/3/2024, due to work related injury, OA, pain and DJD. Pt reported buckling in his  right knee, but denied any falls in the past 3-6 months. Pt had left TKR on 2/15/2023 and was discharged home with rehab services.  Pt has h/o vertigo and for this reason, he uses a SAC due to frequent loss of balance.

## 2023-12-14 NOTE — H&P PST ADULT - NSICDXPASTSURGICALHX_GEN_ALL_CORE_FT
PAST SURGICAL HISTORY:  PFO (patent foramen ovale)     S/P knee surgery     S/P total knee replacement, left

## 2023-12-14 NOTE — H&P PST ADULT - HISTORY OF PRESENT ILLNESS
73M  pmh- hld, cva in 2017 - no residual ,glaucoma, depression and anxiety, colon CA (sx) c/o right knee pain 2/2 unilateral primary osteoarthritis here for PsT for scheduled robotic assisted right total nee arthroplasty with Dr. Siddiqui on 1-3-2024    goal: to walk without pain

## 2023-12-14 NOTE — OCCUPATIONAL THERAPY INITIAL EVALUATION ADULT - GENERAL OBSERVATIONS, REHAB EVAL
Chart reviewed. Patient encountered seated in chair in rehab preop room in [facility]. Patient underwent occupational therapy pre-operative consultation to determine current functional ADL limitations in order to provide the right equipment for patient to perform functional ADL post operation. Chart reviewed. Patient encountered seated in chair in rehab preop room in [facility]. Patient underwent occupational therapy pre-operative consultation to determine current functional ADL limitations in order to provide the right equipment for patient to perform functional ADL post operation.

## 2023-12-14 NOTE — OCCUPATIONAL THERAPY INITIAL EVALUATION ADULT - LIVES WITH, PROFILE
in a private house with 3 entry steps equipped with left ascending handrail . All living amenities are located on one level. The bathroom has a tub/shower combination, fixed/ retractable shower head and standard toilet with elevated toilet seat without arms./spouse

## 2023-12-15 RX ORDER — MUPIROCIN 20 MG/G
1 OINTMENT TOPICAL
Qty: 1 | Refills: 0
Start: 2023-12-15 | End: 2023-12-19

## 2024-01-02 ENCOUNTER — TRANSCRIPTION ENCOUNTER (OUTPATIENT)
Age: 74
End: 2024-01-02

## 2024-01-03 ENCOUNTER — INPATIENT (INPATIENT)
Facility: HOSPITAL | Age: 74
LOS: 0 days | Discharge: HOME HEALTH | End: 2024-01-04
Attending: ORTHOPAEDIC SURGERY | Admitting: ORTHOPAEDIC SURGERY
Payer: OTHER MISCELLANEOUS

## 2024-01-03 ENCOUNTER — RESULT REVIEW (OUTPATIENT)
Age: 74
End: 2024-01-03

## 2024-01-03 ENCOUNTER — APPOINTMENT (OUTPATIENT)
Dept: ORTHOPEDIC SURGERY | Facility: HOSPITAL | Age: 74
End: 2024-01-03
Payer: OTHER MISCELLANEOUS

## 2024-01-03 VITALS
HEIGHT: 66 IN | TEMPERATURE: 98 F | DIASTOLIC BLOOD PRESSURE: 68 MMHG | SYSTOLIC BLOOD PRESSURE: 116 MMHG | HEART RATE: 76 BPM | OXYGEN SATURATION: 96 % | WEIGHT: 242.07 LBS | RESPIRATION RATE: 16 BRPM

## 2024-01-03 DIAGNOSIS — Z96.652 PRESENCE OF LEFT ARTIFICIAL KNEE JOINT: Chronic | ICD-10-CM

## 2024-01-03 DIAGNOSIS — Z98.890 OTHER SPECIFIED POSTPROCEDURAL STATES: Chronic | ICD-10-CM

## 2024-01-03 DIAGNOSIS — Q21.12 PATENT FORAMEN OVALE: Chronic | ICD-10-CM

## 2024-01-03 LAB
ANION GAP SERPL CALC-SCNC: 5 MMOL/L — SIGNIFICANT CHANGE UP (ref 5–17)
ANION GAP SERPL CALC-SCNC: 5 MMOL/L — SIGNIFICANT CHANGE UP (ref 5–17)
BLD GP AB SCN SERPL QL: SIGNIFICANT CHANGE UP
BLD GP AB SCN SERPL QL: SIGNIFICANT CHANGE UP
BUN SERPL-MCNC: 26 MG/DL — HIGH (ref 7–23)
BUN SERPL-MCNC: 26 MG/DL — HIGH (ref 7–23)
CALCIUM SERPL-MCNC: 8.6 MG/DL — SIGNIFICANT CHANGE UP (ref 8.5–10.1)
CALCIUM SERPL-MCNC: 8.6 MG/DL — SIGNIFICANT CHANGE UP (ref 8.5–10.1)
CHLORIDE SERPL-SCNC: 111 MMOL/L — HIGH (ref 96–108)
CHLORIDE SERPL-SCNC: 111 MMOL/L — HIGH (ref 96–108)
CO2 SERPL-SCNC: 24 MMOL/L — SIGNIFICANT CHANGE UP (ref 22–31)
CO2 SERPL-SCNC: 24 MMOL/L — SIGNIFICANT CHANGE UP (ref 22–31)
CREAT SERPL-MCNC: 1.21 MG/DL — SIGNIFICANT CHANGE UP (ref 0.5–1.3)
CREAT SERPL-MCNC: 1.21 MG/DL — SIGNIFICANT CHANGE UP (ref 0.5–1.3)
EGFR: 63 ML/MIN/1.73M2 — SIGNIFICANT CHANGE UP
EGFR: 63 ML/MIN/1.73M2 — SIGNIFICANT CHANGE UP
GLUCOSE BLDC GLUCOMTR-MCNC: 101 MG/DL — HIGH (ref 70–99)
GLUCOSE BLDC GLUCOMTR-MCNC: 101 MG/DL — HIGH (ref 70–99)
GLUCOSE BLDC GLUCOMTR-MCNC: 94 MG/DL — SIGNIFICANT CHANGE UP (ref 70–99)
GLUCOSE BLDC GLUCOMTR-MCNC: 94 MG/DL — SIGNIFICANT CHANGE UP (ref 70–99)
GLUCOSE SERPL-MCNC: 99 MG/DL — SIGNIFICANT CHANGE UP (ref 70–99)
GLUCOSE SERPL-MCNC: 99 MG/DL — SIGNIFICANT CHANGE UP (ref 70–99)
HCT VFR BLD CALC: 36.8 % — LOW (ref 39–50)
HCT VFR BLD CALC: 36.8 % — LOW (ref 39–50)
HGB BLD-MCNC: 12.5 G/DL — LOW (ref 13–17)
HGB BLD-MCNC: 12.5 G/DL — LOW (ref 13–17)
MCHC RBC-ENTMCNC: 34 G/DL — SIGNIFICANT CHANGE UP (ref 32–36)
MCHC RBC-ENTMCNC: 34 G/DL — SIGNIFICANT CHANGE UP (ref 32–36)
MCHC RBC-ENTMCNC: 34.3 PG — HIGH (ref 27–34)
MCHC RBC-ENTMCNC: 34.3 PG — HIGH (ref 27–34)
MCV RBC AUTO: 101.1 FL — HIGH (ref 80–100)
MCV RBC AUTO: 101.1 FL — HIGH (ref 80–100)
NRBC # BLD: 0 /100 WBCS — SIGNIFICANT CHANGE UP (ref 0–0)
NRBC # BLD: 0 /100 WBCS — SIGNIFICANT CHANGE UP (ref 0–0)
PLATELET # BLD AUTO: 154 K/UL — SIGNIFICANT CHANGE UP (ref 150–400)
PLATELET # BLD AUTO: 154 K/UL — SIGNIFICANT CHANGE UP (ref 150–400)
POTASSIUM SERPL-MCNC: 4.4 MMOL/L — SIGNIFICANT CHANGE UP (ref 3.5–5.3)
POTASSIUM SERPL-MCNC: 4.4 MMOL/L — SIGNIFICANT CHANGE UP (ref 3.5–5.3)
POTASSIUM SERPL-SCNC: 4.4 MMOL/L — SIGNIFICANT CHANGE UP (ref 3.5–5.3)
POTASSIUM SERPL-SCNC: 4.4 MMOL/L — SIGNIFICANT CHANGE UP (ref 3.5–5.3)
RBC # BLD: 3.64 M/UL — LOW (ref 4.2–5.8)
RBC # BLD: 3.64 M/UL — LOW (ref 4.2–5.8)
RBC # FLD: 13.2 % — SIGNIFICANT CHANGE UP (ref 10.3–14.5)
RBC # FLD: 13.2 % — SIGNIFICANT CHANGE UP (ref 10.3–14.5)
SODIUM SERPL-SCNC: 140 MMOL/L — SIGNIFICANT CHANGE UP (ref 135–145)
SODIUM SERPL-SCNC: 140 MMOL/L — SIGNIFICANT CHANGE UP (ref 135–145)
WBC # BLD: 5.99 K/UL — SIGNIFICANT CHANGE UP (ref 3.8–10.5)
WBC # BLD: 5.99 K/UL — SIGNIFICANT CHANGE UP (ref 3.8–10.5)
WBC # FLD AUTO: 5.99 K/UL — SIGNIFICANT CHANGE UP (ref 3.8–10.5)
WBC # FLD AUTO: 5.99 K/UL — SIGNIFICANT CHANGE UP (ref 3.8–10.5)

## 2024-01-03 PROCEDURE — 73560 X-RAY EXAM OF KNEE 1 OR 2: CPT | Mod: 26,RT

## 2024-01-03 PROCEDURE — 27447 TOTAL KNEE ARTHROPLASTY: CPT | Mod: AS,RT

## 2024-01-03 PROCEDURE — 20985 CPTR-ASST DIR MS PX: CPT

## 2024-01-03 PROCEDURE — 27447 TOTAL KNEE ARTHROPLASTY: CPT | Mod: RT

## 2024-01-03 DEVICE — MAKO BONE PIN 3.2MM X 140MM: Type: IMPLANTABLE DEVICE | Site: RIGHT | Status: FUNCTIONAL

## 2024-01-03 DEVICE — MAKO BONE PIN 3.2MM X 110MM: Type: IMPLANTABLE DEVICE | Site: RIGHT | Status: FUNCTIONAL

## 2024-01-03 DEVICE — PATELLA ASYMM TRIATHLON SZ A 32X10MM: Type: IMPLANTABLE DEVICE | Site: RIGHT | Status: FUNCTIONAL

## 2024-01-03 DEVICE — TIBIAL COMPONENT: Type: IMPLANTABLE DEVICE | Site: RIGHT | Status: FUNCTIONAL

## 2024-01-03 DEVICE — INSERT TIB BEARING CS X3 SZ 5 9MM: Type: IMPLANTABLE DEVICE | Site: RIGHT | Status: FUNCTIONAL

## 2024-01-03 DEVICE — COMP FEM CR CMNTLSS BEADED W/ PA SZ 4 RT: Type: IMPLANTABLE DEVICE | Site: RIGHT | Status: FUNCTIONAL

## 2024-01-03 RX ORDER — SODIUM CHLORIDE 9 MG/ML
1000 INJECTION, SOLUTION INTRAVENOUS
Refills: 0 | Status: DISCONTINUED | OUTPATIENT
Start: 2024-01-03 | End: 2024-01-03

## 2024-01-03 RX ORDER — DEXAMETHASONE 0.5 MG/5ML
10 ELIXIR ORAL ONCE
Refills: 0 | Status: COMPLETED | OUTPATIENT
Start: 2024-01-04 | End: 2024-01-04

## 2024-01-03 RX ORDER — FENTANYL CITRATE 50 UG/ML
50 INJECTION INTRAVENOUS ONCE
Refills: 0 | Status: DISCONTINUED | OUTPATIENT
Start: 2024-01-03 | End: 2024-01-03

## 2024-01-03 RX ORDER — DIAZEPAM 5 MG
5 TABLET ORAL
Refills: 0 | Status: DISCONTINUED | OUTPATIENT
Start: 2024-01-03 | End: 2024-01-04

## 2024-01-03 RX ORDER — CELECOXIB 200 MG/1
200 CAPSULE ORAL ONCE
Refills: 0 | Status: COMPLETED | OUTPATIENT
Start: 2024-01-03 | End: 2024-01-03

## 2024-01-03 RX ORDER — ACETAMINOPHEN 500 MG
1000 TABLET ORAL ONCE
Refills: 0 | Status: DISCONTINUED | OUTPATIENT
Start: 2024-01-03 | End: 2024-01-04

## 2024-01-03 RX ORDER — SODIUM CHLORIDE 9 MG/ML
1000 INJECTION INTRAMUSCULAR; INTRAVENOUS; SUBCUTANEOUS
Refills: 0 | Status: DISCONTINUED | OUTPATIENT
Start: 2024-01-03 | End: 2024-01-04

## 2024-01-03 RX ORDER — LATANOPROST 0.05 MG/ML
1 SOLUTION/ DROPS OPHTHALMIC; TOPICAL AT BEDTIME
Refills: 0 | Status: DISCONTINUED | OUTPATIENT
Start: 2024-01-03 | End: 2024-01-04

## 2024-01-03 RX ORDER — ATORVASTATIN CALCIUM 80 MG/1
20 TABLET, FILM COATED ORAL AT BEDTIME
Refills: 0 | Status: DISCONTINUED | OUTPATIENT
Start: 2024-01-03 | End: 2024-01-04

## 2024-01-03 RX ORDER — INFLUENZA VIRUS VACCINE 15; 15; 15; 15 UG/.5ML; UG/.5ML; UG/.5ML; UG/.5ML
0.7 SUSPENSION INTRAMUSCULAR ONCE
Refills: 0 | Status: DISCONTINUED | OUTPATIENT
Start: 2024-01-03 | End: 2024-01-04

## 2024-01-03 RX ORDER — MAGNESIUM HYDROXIDE 400 MG/1
30 TABLET, CHEWABLE ORAL DAILY
Refills: 0 | Status: DISCONTINUED | OUTPATIENT
Start: 2024-01-03 | End: 2024-01-04

## 2024-01-03 RX ORDER — ONDANSETRON 8 MG/1
4 TABLET, FILM COATED ORAL ONCE
Refills: 0 | Status: DISCONTINUED | OUTPATIENT
Start: 2024-01-03 | End: 2024-01-03

## 2024-01-03 RX ORDER — SERTRALINE 25 MG/1
1 TABLET, FILM COATED ORAL
Qty: 0 | Refills: 0 | DISCHARGE

## 2024-01-03 RX ORDER — OXYCODONE HYDROCHLORIDE 5 MG/1
5 TABLET ORAL
Refills: 0 | Status: DISCONTINUED | OUTPATIENT
Start: 2024-01-03 | End: 2024-01-04

## 2024-01-03 RX ORDER — FENTANYL CITRATE 50 UG/ML
25 INJECTION INTRAVENOUS ONCE
Refills: 0 | Status: DISCONTINUED | OUTPATIENT
Start: 2024-01-03 | End: 2024-01-03

## 2024-01-03 RX ORDER — HYDROMORPHONE HYDROCHLORIDE 2 MG/ML
0.5 INJECTION INTRAMUSCULAR; INTRAVENOUS; SUBCUTANEOUS ONCE
Refills: 0 | Status: DISCONTINUED | OUTPATIENT
Start: 2024-01-03 | End: 2024-01-04

## 2024-01-03 RX ORDER — LANOLIN ALCOHOL/MO/W.PET/CERES
3 CREAM (GRAM) TOPICAL AT BEDTIME
Refills: 0 | Status: DISCONTINUED | OUTPATIENT
Start: 2024-01-03 | End: 2024-01-04

## 2024-01-03 RX ORDER — ACETAMINOPHEN 500 MG
1000 TABLET ORAL EVERY 8 HOURS
Refills: 0 | Status: DISCONTINUED | OUTPATIENT
Start: 2024-01-03 | End: 2024-01-04

## 2024-01-03 RX ORDER — SERTRALINE 25 MG/1
50 TABLET, FILM COATED ORAL DAILY
Refills: 0 | Status: DISCONTINUED | OUTPATIENT
Start: 2024-01-03 | End: 2024-01-04

## 2024-01-03 RX ORDER — SENNA PLUS 8.6 MG/1
2 TABLET ORAL AT BEDTIME
Refills: 0 | Status: DISCONTINUED | OUTPATIENT
Start: 2024-01-03 | End: 2024-01-04

## 2024-01-03 RX ORDER — KETOROLAC TROMETHAMINE 30 MG/ML
30 SYRINGE (ML) INJECTION EVERY 6 HOURS
Refills: 0 | Status: DISCONTINUED | OUTPATIENT
Start: 2024-01-03 | End: 2024-01-04

## 2024-01-03 RX ORDER — ASPIRIN/CALCIUM CARB/MAGNESIUM 324 MG
81 TABLET ORAL
Refills: 0 | Status: DISCONTINUED | OUTPATIENT
Start: 2024-01-04 | End: 2024-01-04

## 2024-01-03 RX ORDER — ONDANSETRON 8 MG/1
4 TABLET, FILM COATED ORAL EVERY 6 HOURS
Refills: 0 | Status: DISCONTINUED | OUTPATIENT
Start: 2024-01-03 | End: 2024-01-04

## 2024-01-03 RX ORDER — TRAVOPROST 0.04 MG/ML
1 SOLUTION/ DROPS OPHTHALMIC
Refills: 0 | DISCHARGE

## 2024-01-03 RX ORDER — DIAZEPAM 5 MG
1 TABLET ORAL
Refills: 0 | DISCHARGE

## 2024-01-03 RX ORDER — ACETAMINOPHEN 500 MG
650 TABLET ORAL ONCE
Refills: 0 | Status: COMPLETED | OUTPATIENT
Start: 2024-01-03 | End: 2024-01-03

## 2024-01-03 RX ORDER — OXYCODONE HYDROCHLORIDE 5 MG/1
5 TABLET ORAL EVERY 4 HOURS
Refills: 0 | Status: DISCONTINUED | OUTPATIENT
Start: 2024-01-04 | End: 2024-01-04

## 2024-01-03 RX ORDER — POLYETHYLENE GLYCOL 3350 17 G/17G
17 POWDER, FOR SOLUTION ORAL AT BEDTIME
Refills: 0 | Status: DISCONTINUED | OUTPATIENT
Start: 2024-01-03 | End: 2024-01-04

## 2024-01-03 RX ORDER — CEFAZOLIN SODIUM 1 G
2000 VIAL (EA) INJECTION EVERY 8 HOURS
Refills: 0 | Status: COMPLETED | OUTPATIENT
Start: 2024-01-03 | End: 2024-01-04

## 2024-01-03 RX ORDER — SODIUM CHLORIDE 9 MG/ML
500 INJECTION INTRAMUSCULAR; INTRAVENOUS; SUBCUTANEOUS ONCE
Refills: 0 | Status: COMPLETED | OUTPATIENT
Start: 2024-01-03 | End: 2024-01-03

## 2024-01-03 RX ORDER — PANTOPRAZOLE SODIUM 20 MG/1
40 TABLET, DELAYED RELEASE ORAL
Refills: 0 | Status: DISCONTINUED | OUTPATIENT
Start: 2024-01-03 | End: 2024-01-04

## 2024-01-03 RX ORDER — BENZOCAINE AND MENTHOL 5; 1 G/100ML; G/100ML
1 LIQUID ORAL ONCE
Refills: 0 | Status: DISCONTINUED | OUTPATIENT
Start: 2024-01-03 | End: 2024-01-04

## 2024-01-03 RX ORDER — CYCLOBENZAPRINE HYDROCHLORIDE 10 MG/1
1 TABLET, FILM COATED ORAL
Qty: 0 | Refills: 0 | DISCHARGE

## 2024-01-03 RX ORDER — OXYCODONE HYDROCHLORIDE 5 MG/1
10 TABLET ORAL
Refills: 0 | Status: DISCONTINUED | OUTPATIENT
Start: 2024-01-03 | End: 2024-01-04

## 2024-01-03 RX ADMIN — CELECOXIB 200 MILLIGRAM(S): 200 CAPSULE ORAL at 15:03

## 2024-01-03 RX ADMIN — SODIUM CHLORIDE 500 MILLILITER(S): 9 INJECTION INTRAMUSCULAR; INTRAVENOUS; SUBCUTANEOUS at 18:35

## 2024-01-03 RX ADMIN — Medication 5 MILLIGRAM(S): at 23:44

## 2024-01-03 RX ADMIN — Medication 1000 MILLIGRAM(S): at 21:41

## 2024-01-03 RX ADMIN — Medication 30 MILLIGRAM(S): at 21:42

## 2024-01-03 RX ADMIN — ATORVASTATIN CALCIUM 20 MILLIGRAM(S): 80 TABLET, FILM COATED ORAL at 21:53

## 2024-01-03 RX ADMIN — LATANOPROST 1 DROP(S): 0.05 SOLUTION/ DROPS OPHTHALMIC; TOPICAL at 21:41

## 2024-01-03 RX ADMIN — POLYETHYLENE GLYCOL 3350 17 GRAM(S): 17 POWDER, FOR SOLUTION ORAL at 21:41

## 2024-01-03 RX ADMIN — SENNA PLUS 2 TABLET(S): 8.6 TABLET ORAL at 21:41

## 2024-01-03 RX ADMIN — Medication 30 MILLIGRAM(S): at 21:41

## 2024-01-03 RX ADMIN — SODIUM CHLORIDE 3 MILLILITER(S): 9 INJECTION INTRAMUSCULAR; INTRAVENOUS; SUBCUTANEOUS at 21:53

## 2024-01-03 RX ADMIN — Medication 650 MILLIGRAM(S): at 15:03

## 2024-01-03 RX ADMIN — SODIUM CHLORIDE 75 MILLILITER(S): 9 INJECTION, SOLUTION INTRAVENOUS at 18:35

## 2024-01-03 NOTE — PHYSICAL THERAPY INITIAL EVALUATION ADULT - ADDITIONAL COMMENTS
Pt lives in a private house with 3 entry steps equipped with left ascending handrail . All living amenities are located on one level. The bathroom has a tub/shower combination, fixed/ retractable shower head and standard toilet with elevated toilet seat without arms.

## 2024-01-03 NOTE — PATIENT PROFILE ADULT - NSPROGENOTHERPROVIDER_GEN_A_NUR
Eliptical Excision Additional Text (Leave Blank If You Do Not Want): The margin was drawn around the clinically apparent lesion.  An elliptical shape was then drawn on the skin incorporating the lesion and margins.  Incisions were then made along these lines to the appropriate tissue plane and the lesion was extirpated. none

## 2024-01-03 NOTE — CONSULT NOTE ADULT - SUBJECTIVE AND OBJECTIVE BOX
HANG BOWLES is a 73y Male s/p ROBOTIC ASSISTED RIGHT TOTAL KNEE ARTHROPLASTY WITH CATHERINE      w/ h/o HLD (hyperlipidemia)    Vertigo    Anxiety and depression    History of glaucoma    CVA (cerebrovascular accident)      denies any chest pain shortness of breath palpitation dizziness lightheadedness nausea vomiting fever or chills    S/P knee surgery    S/P total knee replacement, left    PFO (patent foramen ovale)        SH: doesnot smoke or drink at this time    No Known Allergies    acetaminophen     Tablet .. 1000 milliGRAM(s) Oral every 8 hours  acetaminophen   IVPB .. 1000 milliGRAM(s) IV Intermittent once  acetaminophen   IVPB .. 1000 milliGRAM(s) IV Intermittent once  atorvastatin 20 milliGRAM(s) Oral at bedtime  benzocaine/menthol Lozenge 1 Lozenge Oral once  diazepam    Tablet 5 milliGRAM(s) Oral four times a day PRN  HYDROmorphone  Injectable 0.5 milliGRAM(s) IV Push once  influenza  Vaccine (HIGH DOSE) 0.7 milliLiter(s) IntraMuscular once  ketorolac   Injectable 30 milliGRAM(s) IV Push every 6 hours  latanoprost 0.005% Ophthalmic Solution 1 Drop(s) Both EYES at bedtime  magnesium hydroxide Suspension 30 milliLiter(s) Oral daily PRN  melatonin 3 milliGRAM(s) Oral at bedtime  melatonin 3 milliGRAM(s) Oral at bedtime PRN  multivitamin 1 Tablet(s) Oral daily  ondansetron Injectable 4 milliGRAM(s) IV Push every 6 hours PRN  oxyCODONE    IR 5 milliGRAM(s) Oral every 3 hours PRN  oxyCODONE    IR 10 milliGRAM(s) Oral every 3 hours PRN  pantoprazole    Tablet 40 milliGRAM(s) Oral before breakfast  polyethylene glycol 3350 17 Gram(s) Oral at bedtime  senna 2 Tablet(s) Oral at bedtime  sertraline 50 milliGRAM(s) Oral daily  sodium chloride 0.9% lock flush 3 milliLiter(s) IV Push every 8 hours  sodium chloride 0.9%. 1000 milliLiter(s) IV Continuous <Continuous>    T(C): 36.4 (01-03-24 @ 20:40), Max: 36.7 (01-03-24 @ 14:35)  HR: 62 (01-03-24 @ 20:40) (54 - 76)  BP: 126/81 (01-03-24 @ 20:40) (98/63 - 126/81)  RR: 16 (01-03-24 @ 20:40) (12 - 20)  SpO2: 94% (01-03-24 @ 20:40) (94% - 98%)  HEENT unremarkable  neck no JVD or bruit  heart normal S1 S2 RRR no gallops or rubs  chest clear to auscultation  abd sof nontender non distended +bs  ext no calf tenderness    A/P   DVT PX  pain control  bowel regimen   wound care as per ortho  GI PX  antiemetics prn  incentive spirometer

## 2024-01-03 NOTE — PROGRESS NOTE ADULT - SUBJECTIVE AND OBJECTIVE BOX
***POSTOP CHECK S/P TKA***    SUBJECTIVE:  Pt seen and examined at bedside. Tolerated procedure well without complications. Transported to ***PACU/floor in stable condition. Patient doing well with no complaints overall. Reports pain well-controlled with medication. No CP, SOB, N/V, F/C, new N/T.    Vital Signs (24 Hrs):  T(C): 36.4 (01-03-24 @ 20:40), Max: 36.7 (01-03-24 @ 14:35)  HR: 62 (01-03-24 @ 20:40) (54 - 76)  BP: 126/81 (01-03-24 @ 20:40) (98/63 - 126/81)  RR: 16 (01-03-24 @ 20:40) (12 - 20)  SpO2: 94% (01-03-24 @ 20:40) (94% - 98%)  Wt(kg): --    LABS:                          12.5   5.99  )-----------( 154      ( 03 Jan 2024 18:16 )             36.8     01-03    140  |  111<H>  |  26<H>  ----------------------------<  99  4.4   |  24  |  1.21    Ca    8.6      03 Jan 2024 18:16      EXAM:    General: NAD, awake and alert    RLE:    Dressings C/D/I  +Mild prabhakar-incisional TTP, otherwise NTTP  Compartments soft and compressible, no calf pain  Motor: Fires HF/KE/Gsc/TA/EHL/FHL  Sensory: SILT SPN/DPN/Saph/Olga Lidia/Tib  + DP and PT pulses    A/P:    72yo M s/p R TKA w/ Dr. Siddiqui on 1/3/24  - WBAT  - PT/OT daily while inpatient  - Pain control as needed  - Postop abx x 24h  - Advance diet as tolerated, can discontinue IV fluids once tolerating diet  - DVT ppx: aspirin 81mg BID  - Incentive spirometry  - Ice and elevate as needed  - Dispo: per PT eval  - Discussed w/ Dr. Siddiqui who agrees w/ plan, will update as needed   ***POSTOP CHECK S/P TKA***    SUBJECTIVE:  Pt seen and examined at bedside. Tolerated procedure well without complications. Transported to ***PACU/floor in stable condition. Patient doing well with no complaints overall. Reports pain well-controlled with medication. No CP, SOB, N/V, F/C, new N/T.    Vital Signs (24 Hrs):  T(C): 36.4 (01-03-24 @ 20:40), Max: 36.7 (01-03-24 @ 14:35)  HR: 62 (01-03-24 @ 20:40) (54 - 76)  BP: 126/81 (01-03-24 @ 20:40) (98/63 - 126/81)  RR: 16 (01-03-24 @ 20:40) (12 - 20)  SpO2: 94% (01-03-24 @ 20:40) (94% - 98%)  Wt(kg): --    LABS:                          12.5   5.99  )-----------( 154      ( 03 Jan 2024 18:16 )             36.8     01-03    140  |  111<H>  |  26<H>  ----------------------------<  99  4.4   |  24  |  1.21    Ca    8.6      03 Jan 2024 18:16      EXAM:    General: NAD, awake and alert    RLE:    Dressings C/D/I  +Mild prabhakar-incisional TTP, otherwise NTTP  Compartments soft and compressible, no calf pain  Motor: Fires HF/KE/Gsc/TA/EHL/FHL  Sensory: SILT SPN/DPN/Saph/Olga Lidia/Tib  + DP and PT pulses    A/P:    74yo M s/p R TKA w/ Dr. Siddiqui on 1/3/24  - WBAT  - PT/OT daily while inpatient  - Pain control as needed  - Postop abx x 24h  - Advance diet as tolerated, can discontinue IV fluids once tolerating diet  - DVT ppx: aspirin 81mg BID  - Incentive spirometry  - Ice and elevate as needed  - Dispo: per PT eval  - Discussed w/ Dr. Siddiqui who agrees w/ plan, will update as needed

## 2024-01-04 ENCOUNTER — TRANSCRIPTION ENCOUNTER (OUTPATIENT)
Age: 74
End: 2024-01-04

## 2024-01-04 VITALS
HEART RATE: 72 BPM | TEMPERATURE: 98 F | DIASTOLIC BLOOD PRESSURE: 72 MMHG | SYSTOLIC BLOOD PRESSURE: 123 MMHG | RESPIRATION RATE: 18 BRPM | OXYGEN SATURATION: 95 %

## 2024-01-04 LAB
ANION GAP SERPL CALC-SCNC: 6 MMOL/L — SIGNIFICANT CHANGE UP (ref 5–17)
ANION GAP SERPL CALC-SCNC: 6 MMOL/L — SIGNIFICANT CHANGE UP (ref 5–17)
BUN SERPL-MCNC: 26 MG/DL — HIGH (ref 7–23)
BUN SERPL-MCNC: 26 MG/DL — HIGH (ref 7–23)
CALCIUM SERPL-MCNC: 8.6 MG/DL — SIGNIFICANT CHANGE UP (ref 8.5–10.1)
CALCIUM SERPL-MCNC: 8.6 MG/DL — SIGNIFICANT CHANGE UP (ref 8.5–10.1)
CHLORIDE SERPL-SCNC: 110 MMOL/L — HIGH (ref 96–108)
CHLORIDE SERPL-SCNC: 110 MMOL/L — HIGH (ref 96–108)
CO2 SERPL-SCNC: 23 MMOL/L — SIGNIFICANT CHANGE UP (ref 22–31)
CO2 SERPL-SCNC: 23 MMOL/L — SIGNIFICANT CHANGE UP (ref 22–31)
CREAT SERPL-MCNC: 1.32 MG/DL — HIGH (ref 0.5–1.3)
CREAT SERPL-MCNC: 1.32 MG/DL — HIGH (ref 0.5–1.3)
EGFR: 57 ML/MIN/1.73M2 — LOW
EGFR: 57 ML/MIN/1.73M2 — LOW
GLUCOSE SERPL-MCNC: 147 MG/DL — HIGH (ref 70–99)
GLUCOSE SERPL-MCNC: 147 MG/DL — HIGH (ref 70–99)
HCT VFR BLD CALC: 33.5 % — LOW (ref 39–50)
HCT VFR BLD CALC: 33.5 % — LOW (ref 39–50)
HGB BLD-MCNC: 11.5 G/DL — LOW (ref 13–17)
HGB BLD-MCNC: 11.5 G/DL — LOW (ref 13–17)
MCHC RBC-ENTMCNC: 34.3 G/DL — SIGNIFICANT CHANGE UP (ref 32–36)
MCHC RBC-ENTMCNC: 34.3 G/DL — SIGNIFICANT CHANGE UP (ref 32–36)
MCHC RBC-ENTMCNC: 34.7 PG — HIGH (ref 27–34)
MCHC RBC-ENTMCNC: 34.7 PG — HIGH (ref 27–34)
MCV RBC AUTO: 101.2 FL — HIGH (ref 80–100)
MCV RBC AUTO: 101.2 FL — HIGH (ref 80–100)
NRBC # BLD: 0 /100 WBCS — SIGNIFICANT CHANGE UP (ref 0–0)
NRBC # BLD: 0 /100 WBCS — SIGNIFICANT CHANGE UP (ref 0–0)
PLATELET # BLD AUTO: 160 K/UL — SIGNIFICANT CHANGE UP (ref 150–400)
PLATELET # BLD AUTO: 160 K/UL — SIGNIFICANT CHANGE UP (ref 150–400)
POTASSIUM SERPL-MCNC: 4.3 MMOL/L — SIGNIFICANT CHANGE UP (ref 3.5–5.3)
POTASSIUM SERPL-MCNC: 4.3 MMOL/L — SIGNIFICANT CHANGE UP (ref 3.5–5.3)
POTASSIUM SERPL-SCNC: 4.3 MMOL/L — SIGNIFICANT CHANGE UP (ref 3.5–5.3)
POTASSIUM SERPL-SCNC: 4.3 MMOL/L — SIGNIFICANT CHANGE UP (ref 3.5–5.3)
RBC # BLD: 3.31 M/UL — LOW (ref 4.2–5.8)
RBC # BLD: 3.31 M/UL — LOW (ref 4.2–5.8)
RBC # FLD: 13.3 % — SIGNIFICANT CHANGE UP (ref 10.3–14.5)
RBC # FLD: 13.3 % — SIGNIFICANT CHANGE UP (ref 10.3–14.5)
SODIUM SERPL-SCNC: 139 MMOL/L — SIGNIFICANT CHANGE UP (ref 135–145)
SODIUM SERPL-SCNC: 139 MMOL/L — SIGNIFICANT CHANGE UP (ref 135–145)
WBC # BLD: 13.35 K/UL — HIGH (ref 3.8–10.5)
WBC # BLD: 13.35 K/UL — HIGH (ref 3.8–10.5)
WBC # FLD AUTO: 13.35 K/UL — HIGH (ref 3.8–10.5)
WBC # FLD AUTO: 13.35 K/UL — HIGH (ref 3.8–10.5)

## 2024-01-04 PROCEDURE — 99231 SBSQ HOSP IP/OBS SF/LOW 25: CPT | Mod: 24

## 2024-01-04 RX ORDER — PANTOPRAZOLE SODIUM 20 MG/1
1 TABLET, DELAYED RELEASE ORAL
Qty: 30 | Refills: 0
Start: 2024-01-04 | End: 2024-02-02

## 2024-01-04 RX ORDER — OXYCODONE HYDROCHLORIDE 5 MG/1
1 TABLET ORAL
Qty: 42 | Refills: 0
Start: 2024-01-04 | End: 2024-01-10

## 2024-01-04 RX ORDER — ASPIRIN/CALCIUM CARB/MAGNESIUM 324 MG
1 TABLET ORAL
Qty: 0 | Refills: 0 | DISCHARGE
Start: 2024-01-04

## 2024-01-04 RX ORDER — ATORVASTATIN CALCIUM 80 MG/1
1 TABLET, FILM COATED ORAL
Refills: 0 | DISCHARGE

## 2024-01-04 RX ORDER — NALOXONE HYDROCHLORIDE 4 MG/.1ML
4 SPRAY NASAL
Qty: 1 | Refills: 0
Start: 2024-01-04 | End: 2024-01-04

## 2024-01-04 RX ORDER — ATORVASTATIN CALCIUM 80 MG/1
1 TABLET, FILM COATED ORAL
Qty: 0 | Refills: 0 | DISCHARGE
Start: 2024-01-04

## 2024-01-04 RX ORDER — SODIUM CHLORIDE 9 MG/ML
1000 INJECTION INTRAMUSCULAR; INTRAVENOUS; SUBCUTANEOUS ONCE
Refills: 0 | Status: COMPLETED | OUTPATIENT
Start: 2024-01-04 | End: 2024-01-04

## 2024-01-04 RX ORDER — ACETAMINOPHEN 500 MG
2 TABLET ORAL
Qty: 0 | Refills: 0 | DISCHARGE
Start: 2024-01-04

## 2024-01-04 RX ORDER — POLYETHYLENE GLYCOL 3350 17 G/17G
17 POWDER, FOR SOLUTION ORAL
Qty: 0 | Refills: 0 | DISCHARGE
Start: 2024-01-04

## 2024-01-04 RX ORDER — SENNA PLUS 8.6 MG/1
2 TABLET ORAL
Qty: 0 | Refills: 0 | DISCHARGE
Start: 2024-01-04

## 2024-01-04 RX ADMIN — Medication 1000 MILLIGRAM(S): at 14:43

## 2024-01-04 RX ADMIN — Medication 102 MILLIGRAM(S): at 05:22

## 2024-01-04 RX ADMIN — Medication 1000 MILLIGRAM(S): at 05:23

## 2024-01-04 RX ADMIN — SODIUM CHLORIDE 500 MILLILITER(S): 9 INJECTION INTRAMUSCULAR; INTRAVENOUS; SUBCUTANEOUS at 08:54

## 2024-01-04 RX ADMIN — PANTOPRAZOLE SODIUM 40 MILLIGRAM(S): 20 TABLET, DELAYED RELEASE ORAL at 05:23

## 2024-01-04 RX ADMIN — OXYCODONE HYDROCHLORIDE 5 MILLIGRAM(S): 5 TABLET ORAL at 10:04

## 2024-01-04 RX ADMIN — Medication 100 MILLIGRAM(S): at 07:47

## 2024-01-04 RX ADMIN — Medication 30 MILLIGRAM(S): at 05:23

## 2024-01-04 RX ADMIN — Medication 100 MILLIGRAM(S): at 00:00

## 2024-01-04 RX ADMIN — Medication 81 MILLIGRAM(S): at 05:23

## 2024-01-04 RX ADMIN — SODIUM CHLORIDE 3 MILLILITER(S): 9 INJECTION INTRAMUSCULAR; INTRAVENOUS; SUBCUTANEOUS at 06:10

## 2024-01-04 RX ADMIN — Medication 30 MILLIGRAM(S): at 05:22

## 2024-01-04 RX ADMIN — Medication 1 TABLET(S): at 11:33

## 2024-01-04 RX ADMIN — OXYCODONE HYDROCHLORIDE 5 MILLIGRAM(S): 5 TABLET ORAL at 09:04

## 2024-01-04 RX ADMIN — SERTRALINE 50 MILLIGRAM(S): 25 TABLET, FILM COATED ORAL at 11:33

## 2024-01-04 RX ADMIN — Medication 1000 MILLIGRAM(S): at 15:53

## 2024-01-04 NOTE — OCCUPATIONAL THERAPY INITIAL EVALUATION ADULT - GENERAL OBSERVATIONS, REHAB EVAL
Pt was encountered OOB in chair; NAD, PIV +, BLE pneumatic pumps +, S/P R TKR POD 1, RLE WBAT, R knee dressing ace wrapped clean, dry and intact, JOSE ALEJANDRO +, alert, cooperative, followed commands; pt c/o pain in R knee which impacts pt performance with functional ADL's/transfers and mobility.

## 2024-01-04 NOTE — PROGRESS NOTE ADULT - SUBJECTIVE AND OBJECTIVE BOX
HANG BOWLES is a 73y Male s/p ROBOTIC ASSISTED RIGHT TOTAL KNEE ARTHROPLASTY WITH CATHERINE        denies any chest pain shortness of breath palpitation dizziness lightheadedness nausea vomiting fever or chills    T(C): 36.4 (01-04-24 @ 09:00), Max: 36.7 (01-03-24 @ 14:35)  HR: 73 (01-04-24 @ 09:00) (54 - 76)  BP: 138/78 (01-04-24 @ 09:00) (98/63 - 138/78)  RR: 17 (01-04-24 @ 09:00) (12 - 20)  SpO2: 96% (01-04-24 @ 09:00) (94% - 98%)  no jvd/bruit  s1 s2 rrr  cta  s/nt/nd  no calf tend                        11.5   13.35 )-----------( 160      ( 04 Jan 2024 06:10 )             33.5   01-04    139  |  110<H>  |  26<H>  ----------------------------<  147<H>  4.3   |  23  |  1.32<H>    Ca    8.6      04 Jan 2024 06:10        cont dvt px  pain control  bowel regimen  antiemetics  incentive spirometer

## 2024-01-04 NOTE — DISCHARGE NOTE NURSING/CASE MANAGEMENT/SOCIAL WORK - PATIENT PORTAL LINK FT
You can access the FollowMyHealth Patient Portal offered by [facility] by registering at the following website: [email or web address]. By joining [employer]’s FollowMyHealth portal, you will also be able to view your health information using other applications (apps) compatible with our system. You can access the FollowMyHealth Patient Portal offered by [facility] by registering at the following website: [email or web address]. By joining [employer]’s FollowMyHealth portal, you will also be able to view your health information using other applications (apps) compatible with our system.

## 2024-01-04 NOTE — DISCHARGE NOTE PROVIDER - CARE PROVIDER_API CALL
Zac Siddiqui.  Orthopaedic Surgery  [address] [facility], [address] [place] [id number]  Phone: [phone]  Fax: [phone]  Follow Up Time:    Zac Siddiqui.  Orthopaedic Surgery  [address] [facility], [address] [place] [id number]  Phone: [phone]  Fax: [phone]  Follow Up Time:

## 2024-01-04 NOTE — OCCUPATIONAL THERAPY INITIAL EVALUATION ADULT - ADDITIONAL COMMENTS
13-Jan-2021 22:37 Pt lives with spouse (Who can assist post op) in a private house with 3 entry steps equipped with left ascending handrail. All living amenities are located on one level. The bathroom has a tub/shower combination, fixed/ retractable shower head and standard toilet with elevated toilet seat without arms. Pt deferred 3/1 commode stating "I don't need that. I will be fine". Pt ambulates with a cane and owns a rolling walker.

## 2024-01-04 NOTE — DISCHARGE NOTE PROVIDER - NSDCFUADDAPPT_GEN_ALL_CORE_FT
Follow up with your surgeon in two weeks. Call for appointment.    If you need more pain medication, call your surgeon's office. For medication refills or authorizations, please call [phone] xt 2301    We recommend that you call and schedule a follow up appointment with your primary care physician for repeat blood work (CBC and BMP) for post hospital discharge follow-up care 2-4 weeks after your surgery.     Make sure to have a bowel movement every 2-3 days after surgery. Take stool softeners and laxatives as needed.     Call your surgeon if you have increased redness/pain/drainage or fever. Return to ER for shortness of breath/calf tenderness. Follow up with your surgeon in two weeks. Call for appointment.    If you need more pain medication, call your surgeon's office. For medication refills or authorizations, please call [phone] xt 2301    We recommend that you call and schedule a follow up appointment with your primary care physician for repeat blood work (CBC and BMP) for post hospital discharge follow-up care 2-4 weeks after your surgery.     Make sure to have a bowel movement every 2-3 days after surgery. Take stool softeners and laxatives as needed.     Call your surgeon if you have increased redness/pain/drainage or fever. Return to ER for shortness of breath/calf tenderness.

## 2024-01-04 NOTE — DISCHARGE NOTE NURSING/CASE MANAGEMENT/SOCIAL WORK - NSDCPEFALRISK_GEN_ALL_CORE
For information on Fall & Injury Prevention, visit: [email or web address] OR  [email or web address] OR  https://www.cdc.gov/steadi/patient.html For information on Fall & Injury Prevention, visit: [email or web address] OR  [email or web address] OR  https://www.cdc.gov/steadi/patient.html

## 2024-01-04 NOTE — DISCHARGE NOTE PROVIDER - NSDCFUSCHEDAPPT_GEN_ALL_CORE_FT
[facility] Physician [facility]  ON[address]  Scheduled Appointment: 01/16/2024    Berny Thurman  [facility]  ON[address]  Scheduled Appointment: 01/17/2024     [facility] Physician [facility]  [address]  Scheduled Appointment: 01/16/2024    Berny Thurman  [facility]  [address]  Scheduled Appointment: 01/17/2024

## 2024-01-04 NOTE — OCCUPATIONAL THERAPY INITIAL EVALUATION ADULT - LEVEL OF INDEPENDENCE:TOILET, OT EVAL
[facility] Pediatric[facility] Progress Note          Assessment and Plan:   Assessment:   Active Problems:    Community acquired pneumonia    Acute respiratory distress due to pneumonia      Plan:   -Continue current antibiotic regimen  -Will consider cutting IVF this evening to half maintenance if intake is adequate  -Start incentive spirometry, every 2 hours while awake  -Encouraged patient and mom to ambulate every 4 or so hours  -Encouraged patient and mom to utilize tylenol and/or ibuprofen for fever control  -Mother is hopeful that we can send a note to Domitila's pulmonologist, Dr. Michoacano Flor at [facility] upon discharge so he is aware.  We will plan to send a discharge summary to Dr. Flor.     -Plan to review asthma action plan at discharge  -Mother is also requesting to have her albuterol nebs refilled at discharge  -Will consider getting a chemistry panel tomorrow if child continues to be on IVF through the night.            Interval History:   Domitila continues to improve.  She states that she is feeling better overall and her parents agree.  She is still coughing but states that she is coughing less.  This morning I saw her around 0930 and she had a fever of 101.9 and her pulse was elevated.  Her oxygen level was also lower than it had been previously, low 90's on 1L.  We increased her O2 to 2L at that time and got her a dose of antipyretic, which helped quite a bit.  Domitila is feeding little and drinking some per parent report.  They stated that they will push fluids today.  She is tolerating her medications overall but does note diarrhea since starting the antibiotic.  She has no new concerns for today.        Influenza A/B, RSV, and mono all NEGATIVE.  CXR yesterday night showed a RML pneumonia.  General lab work was otherwise unremarkable.              Significant Problems:   Active Problems:    Community acquired pneumonia            Review of Systems:   CONSTITUTIONAL: POSITIVE for fever NEGATIVE  change in weight  INTEGUMENTARY/SKIN: NEGATIVE for worrisome rashes, moles or lesions  ENT/MOUTH: POSITIVE Hx otitis media, nasal congestion and sore throat  RESP:POSITIVE for cough-productive, Hx asthma, Hx pneumonia and SOB/dyspnea; POSITIVE for SOB with exertion  CV: NEGATIVE for chest pain, palpitations or peripheral edema  GI: NEGATIVE for nausea, abdominal pain, heartburn POSITIVE diarrhea  MUSCULOSKELETAL: NEGATIVE for significant arthralgias or myalgia  ROS otherwise negative          Medications:     Current Facility-Administered Medications   Medication     acetaminophen (TYLENOL) chewable tablet 640 mg     albuterol (PROVENTIL) neb solution 2.5 mg     ampicillin (OMNIPEN) 2,000 mg in sodium chloride 0.9 % pediatric injection     azithromycin 250 mg in D5W injection PEDS/NICU     budesonide-formoterol (SYMBICORT) 160-4.5 MCG/ACT Inhaler 2 puff     dextrose 5% and 0.45% NaCl + KCl 20 mEq/L infusion     ibuprofen (ADVIL/MOTRIN) chewable tablet 400 mg             Physical Exam:     Vitals were reviewed  Patient Vitals for the past 8 hrs:   BP Temp Temp src Pulse Resp SpO2   10/22/19 1200 117/61 97.7  F (36.5  C) Oral 125 22 96 %   10/22/19 0833 -- 101.7  F (38.7  C) Oral -- -- --     Constitutional:    awake, alert, cooperative, appears to not be feeling well, no obvious respiratory distress, and appears stated age      ENT:    normocepalic, without obvious abnormality      Neck:    supple, symmetrical, trachea midline      Hematologic / Lymphatic:    Mild anterior cervical lymphadenopathy and no supraclavicular lymphadenopathy      Back:    symmetric and no curvature      Lungs:    Mild accessory muscle usage, Upper lobes clear to auscultation bilaterally, RML and RLL decreased aeration noted.  No wheezing noted.  No retractions, nasal flaring or grunting.        Cardiovascular:    Normal apical impulse, regular rate and rhythm, normal S1 and S2, no S3 or S4, and no murmur noted.  Tachycardia with fever  noted this morning.        Abdomen:    No scars, normal bowel sounds, soft, non-distended, non-tender, no masses palpated, no hepatosplenomegally      Musculoskeletal:    There is no redness, warmth, or swelling of the joints.  Full range of motion noted.  Motor strength is 5 out of 5 all extremities bilaterally.  Tone is normal.      Neurologic:    Awake, alert, oriented to name, place and time.           Skin:    normal skin color, texture, turgor and nails normal without discoloration or clubbing           Data:   All laboratory data reviewed  All imaging studies reviewed by me.    Attestation:  I have reviewed today's vital signs, notes, medications, labs and imaging.  Care coordination / counseling time: 30 minutes     MARISSA Quintero CNP      supervision

## 2024-01-04 NOTE — OCCUPATIONAL THERAPY INITIAL EVALUATION ADULT - IMPAIRED TRANSFERS: BED/CHAIR, REHAB EVAL
Initial Anesthesia Post-op Note    Patient: David Azar  Procedure(s) Performed: RIGHT REVERSE TOTAL SHOULDER ARTHROPLASTY - RIGHT  Anesthesia type: General    Vitals Value Taken Time   Temp 97.1 6/26/2019  4:16 PM   Pulse 74 6/26/2019  4:16 PM   Resp 16 6/26/2019  4:16 PM   /85 6/26/2019  4:14 PM   SpO2 97 6/26/2019  4:16 PM   Vitals shown include unvalidated device data.    Last 24 I/O:     Intake/Output Summary (Last 24 hours) at 6/26/2019 1616  Last data filed at 6/26/2019 1553  Gross per 24 hour   Intake 1000 ml   Output --   Net 1000 ml       PATIENT LOCATION: PACU Phase 1  POST-OP VITAL SIGNS: stable  LEVEL OF CONSCIOUSNESS: participates in exam, alert, oriented, answers questions appropriately and awake  RESPIRATORY STATUS: spontaneous ventilation and unassisted  CARDIOVASCULAR: blood pressure returned to baseline  HYDRATION: euvolemic    PAIN MANAGEMENT: well controlled  NAUSEA: None  AIRWAY PATENCY: patent  POST-OP ASSESSMENT: no complications, patient tolerated procedure well with no complications, no evidence of recall and sufficiently recovered from acute administration of anesthesia effects and able to participate in evaluation  COMPLICATIONS: none  HANDOFF:  Handoff to receiving nurse was performed and questions were answered      
impaired balance/decreased flexibility/pain/decreased strength

## 2024-01-04 NOTE — DISCHARGE NOTE PROVIDER - NSDCFUADDINST_GEN_ALL_CORE_FT
Keep knee straight while at rest. Elevate the leg as much as possible ("toes above the nose") to help control swelling. Make sure you get up and take a brief walk every two hours to help with circulation and prevent stiffness. Incentive spirometer 10X/hour. Cryocuff to help with pain/inflammation.   Per Dr. Siddiqui: may advance from walker as tolerated per discretion of physical therapist.   Keep JOSE ALEJANDRO Dressing Clean, Dry and Intact. May shower with JOSE ALEJANDRO Dressing. Please do not scrub, soak, peel or pick at the JOSE ALEJANDRO dressing. No creams, lotions, or oils over dressing. May shower and let water run over dressing, no baths. Pat dry once out of shower. Dressing to be removed in 7 days. If dressing is saturated from border to border - may remove and replace with clean dry dressing.    Shower instructions for JOSE ALEJANDRO Dressing: Place battery pack in a water sealed bag (such as a Ziplock bag) and keep battery out of the water.   Alternately you can turn battery pack off (press orange button.) Twist tubing OFF battery pack before entering shower. Once done with showering. Pat dressing dry. Reconnect tubing and twist ON battery pack after you are dry. Then turn battery pack on (press orange button.)

## 2024-01-04 NOTE — DISCHARGE NOTE NURSING/CASE MANAGEMENT/SOCIAL WORK - NSDCFUADDAPPT_GEN_ALL_CORE_FT
Follow up with your surgeon in two weeks. Call for appointment.    If you need more pain medication, call your surgeon's office. For medication refills or authorizations, please call [phone] xt 2301    We recommend that you call and schedule a follow up appointment with your primary care physician for repeat blood work (CBC and BMP) for post hospital discharge follow-up care 2-4 weeks after your surgery.     Make sure to have a bowel movement every 2-3 days after surgery. Take stool softeners and laxatives as needed.     Call your surgeon if you have increased redness/pain/drainage or fever. Return to ER for shortness of breath/calf tenderness. Follow up with your surgeon in two weeks. Call for appointment.    If you need more pain medication, call your surgeon's office. For medication refills or authorizations, please call [phone] xt 2301    We recommend that you call and schedule a follow up appointment with your primary care physician for repeat blood work (CBC and BMP) for post hospital discharge follow-up care 2-4 weeks after your surgery.     Make sure to have a bowel movement every 2-3 days after surgery. Take stool softeners and laxatives as needed.     Call your surgeon if you have increased redness/pain/drainage or fever. Return to ER for shortness of breath/calf tenderness.

## 2024-01-04 NOTE — DISCHARGE NOTE PROVIDER - HOSPITAL COURSE
73yMale with history of osteoarthritis of right knee presenting for right TKA by Dr. Zac Siddiqui on 1/3/24. Risk and benefits of surgery were explained to the patient. The patient understood and agreed to proceed with surgery. Patient underwent the procedure with no intraoperative complications. Pt was brought in stable condition to the PACU. Once stable in PACU, pt was brought to the floor. During hospital stay pt was followed by Medicine,  during this admission. Pt hospital course was unremarkable. Pt is stable for discharge to home on POD# 1

## 2024-01-04 NOTE — DISCHARGE NOTE PROVIDER - NSDCMRMEDTOKEN_GEN_ALL_CORE_FT
acetaminophen 500 mg oral tablet: 2 tab(s) orally every 8 hours  aspirin 81 mg oral delayed release tablet: 1 tab(s) orally 2 times a day  atorvastatin 20 mg oral tablet: 1 tab(s) orally once a day (at bedtime)  cyclobenzaprine 10 mg oral tablet: 1 tab(s) orally 3 times a day  Multiple Vitamins oral tablet: 1 tab(s) orally once a day  Narcan 4 mg/0.1 mL nasal spray: 4 milligram(s) intranasally once , repeat as necessary.   As needed. For suspected opiate overdose   Follow instructions on packet MDD: 0.2 ml  oxyCODONE 5 mg oral tablet: 1 tab(s) orally every 4 hours as needed for  pain 1 tab for mild/moderate pain, 2 tabs for severe pain MDD: 6  pantoprazole 40 mg oral delayed release tablet: 1 tab(s) orally once a day (before a meal) MDD: 1  polyethylene glycol 3350 oral powder for reconstitution: 17 gram(s) orally once a day (at bedtime)  senna leaf extract oral tablet: 2 tab(s) orally once a day (at bedtime)  sertraline 50 mg oral tablet: 1 tab(s) orally once a day  Travatan 0.004% ophthalmic solution: 1 drop(s) in each affected eye  Valium 5 mg oral tablet: 1 tab(s) orally 5 times a day as needed for  anxiety

## 2024-01-04 NOTE — PROGRESS NOTE ADULT - SUBJECTIVE AND OBJECTIVE BOX
Low Back Pain: Exercises  Introduction  Here are some examples of exercises for you to try. The exercises may be suggested for a condition or for rehabilitation. Start each exercise slowly. Ease off the exercises if you start to have pain. You will be told when to start these exercises and which ones will work best for you. How to do the exercises  Press-up    1. Lie on your stomach, supporting your body with your forearms. 2. Press your elbows down into the floor to raise your upper back. As you do this, relax your stomach muscles and allow your back to arch without using your back muscles. As your press up, do not let your hips or pelvis come off the floor. 3. Hold for 15 to 30 seconds, then relax. 4. Repeat 2 to 4 times. Alternate arm and leg (bird dog) exercise    1. Start on the floor, on your hands and knees. 2. Tighten your belly muscles. 3. Raise one leg off the floor, and hold it straight out behind you. Be careful not to let your hip drop down, because that will twist your trunk. 4. Hold for about 6 seconds, then lower your leg and switch to the other leg. 5. Repeat 8 to 12 times on each leg. 6. Over time, work up to holding for 10 to 30 seconds each time. 7. If you feel stable and secure with your leg raised, try raising the opposite arm straight out in front of you at the same time. Knee-to-chest exercise    1. Lie on your back with your knees bent and your feet flat on the floor. 2. Bring one knee to your chest, keeping the other foot flat on the floor (or keeping the other leg straight, whichever feels better on your lower back). 3. Keep your lower back pressed to the floor. Hold for at least 15 to 30 seconds. 4. Relax, and lower the knee to the starting position. 5. Repeat with the other leg. Repeat 2 to 4 times with each leg. 6. To get more stretch, put your other leg flat on the floor while pulling your knee to your chest.    Curl-ups    1.  Lie on the floor on your back with your knees bent at a 90-degree angle. Your feet should be flat on the floor, about 12 inches from your buttocks. 2. Cross your arms over your chest. If this bothers your neck, try putting your hands behind your neck (not your head), with your elbows spread apart. 3. Slowly tighten your belly muscles and raise your shoulder blades off the floor. 4. Keep your head in line with your body, and do not press your chin to your chest.  5. Hold this position for 1 or 2 seconds, then slowly lower yourself back down to the floor. 6. Repeat 8 to 12 times. Pelvic tilt exercise    1. Lie on your back with your knees bent. 2. \"Brace\" your stomach. This means to tighten your muscles by pulling in and imagining your belly button moving toward your spine. You should feel like your back is pressing to the floor and your hips and pelvis are rocking back. 3. Hold for about 6 seconds while you breathe smoothly. 4. Repeat 8 to 12 times. Heel dig bridging    1. Lie on your back with both knees bent and your ankles bent so that only your heels are digging into the floor. Your knees should be bent about 90 degrees. 2. Then push your heels into the floor, squeeze your buttocks, and lift your hips off the floor until your shoulders, hips, and knees are all in a straight line. 3. Hold for about 6 seconds as you continue to breathe normally, and then slowly lower your hips back down to the floor and rest for up to 10 seconds. 4. Do 8 to 12 repetitions. Hamstring stretch in doorway    1. Lie on your back in a doorway, with one leg through the open door. 2. Slide your leg up the wall to straighten your knee. You should feel a gentle stretch down the back of your leg. 3. Hold the stretch for at least 15 to 30 seconds. Do not arch your back, point your toes, or bend either knee. Keep one heel touching the floor and the other heel touching the wall. 4. Repeat with your other leg. 5. Do 2 to 4 times for each leg.     Hip Patient is seen and examined at bedside. Denies CP/SOB/Dizziness/N/V/D/HA. Pain is controlled.     Vital Signs Last 24 Hrs  T(C): 36.4 (04 Jan 2024 09:00), Max: 36.7 (03 Jan 2024 14:35)  T(F): 97.5 (04 Jan 2024 09:00), Max: 98 (03 Jan 2024 14:35)  HR: 73 (04 Jan 2024 09:00) (54 - 76)  BP: 138/78 (04 Jan 2024 09:00) (98/63 - 138/78)  BP(mean): --  RR: 17 (04 Jan 2024 09:00) (12 - 20)  SpO2: 96% (04 Jan 2024 09:00) (94% - 98%)    Parameters below as of 04 Jan 2024 09:00  Patient On (Oxygen Delivery Method): room air          PHYSICAL EXAM:  General: NAD  Neuro:  Alert & responsive  HEENT: NCAT, EOMI, conjunctiva clear  abd: soft, NT/ND  Right LE: JOSE ALEJANDRO bandage with small sanguinous stain proximal aspect not extending to borders. Battery flashing green/ok. Motor intact + EHL/FHL/TA/GS.  Sensation is grossly intact.  Extremity warm, compartments soft, compressible. No calf tenderness. DP 2+   Left LE: Motor intact +EHL/FHL/TA/GS. Sensation is grossly intact. Extremity warm, compartments soft, compressible. No calf tenderness. DP2+    Labs:                          11.5   13.35 )-----------( 160      ( 04 Jan 2024 06:10 )             33.5       01-04    139  |  110<H>  |  26<H>  ----------------------------<  147<H>  4.3   |  23  |  1.32<H>    Ca    8.6      04 Jan 2024 06:10        A/P: Patient is a 73y y/o Male s/p right TKA, POD # 1  -wound care, knee extension/leg elevation, cryocuff, isometric exercises, new medications reviewed with pt  -Pain control/analgesia reviewed   -Inc spirometry reviewed with pt, demonstrated competence  -DVT prophylaxis with Venodynes/Aspirin 81mg BID  -Elevated creatinine - no JASPREET but will give NS bolus and avoid NSAIDS  -PT/OT/WBAT  -medical consult reviewed   -DC planning: for home today with home care  -Pt seen in am with DR Siddiqui     flexor stretch    1. Kneel on the floor with one knee bent and one leg behind you. Place your forward knee over your foot. Keep your other knee touching the floor. 2. Slowly push your hips forward until you feel a stretch in the upper thigh of your rear leg. 3. Hold the stretch for at least 15 to 30 seconds. Repeat with your other leg. 4. Do 2 to 4 times on each side. Wall sit    1. Stand with your back 10 to 12 inches away from a wall. 2. Lean into the wall until your back is flat against it. 3. Slowly slide down until your knees are slightly bent, pressing your lower back into the wall. 4. Hold for about 6 seconds, then slide back up the wall. 5. Repeat 8 to 12 times. Follow-up care is a key part of your treatment and safety. Be sure to make and go to all appointments, and call your doctor if you are having problems. It's also a good idea to know your test results and keep a list of the medicines you take. Where can you learn more? Go to http://omar-cornelius.info/. Enter P358 in the search box to learn more about \"Low Back Pain: Exercises. \"  Current as of: September 20, 2018  Content Version: 12.1  © 1529-5960 [employer]. Care instructions adapted under license by [employer] (which disclaims liability or warranty for this information). If you have questions about a medical condition or this instruction, always ask your healthcare professional. [place] [address] any warranty or liability for your use of this information.

## 2024-01-08 DIAGNOSIS — E78.5 HYPERLIPIDEMIA, UNSPECIFIED: ICD-10-CM

## 2024-01-08 DIAGNOSIS — M17.11 UNILATERAL PRIMARY OSTEOARTHRITIS, RIGHT KNEE: ICD-10-CM

## 2024-01-08 DIAGNOSIS — Z96.652 PRESENCE OF LEFT ARTIFICIAL KNEE JOINT: ICD-10-CM

## 2024-01-08 DIAGNOSIS — H40.9 UNSPECIFIED GLAUCOMA: ICD-10-CM

## 2024-01-08 DIAGNOSIS — F41.8 OTHER SPECIFIED ANXIETY DISORDERS: ICD-10-CM

## 2024-01-08 LAB
SURGICAL PATHOLOGY STUDY: SIGNIFICANT CHANGE UP
SURGICAL PATHOLOGY STUDY: SIGNIFICANT CHANGE UP

## 2024-01-09 ENCOUNTER — RX RENEWAL (OUTPATIENT)
Age: 74
End: 2024-01-09

## 2024-01-10 ENCOUNTER — RESULT REVIEW (OUTPATIENT)
Age: 74
End: 2024-01-10

## 2024-01-10 ENCOUNTER — APPOINTMENT (OUTPATIENT)
Dept: ORTHOPEDIC SURGERY | Facility: CLINIC | Age: 74
End: 2024-01-10
Payer: OTHER MISCELLANEOUS

## 2024-01-10 VITALS — WEIGHT: 235 LBS | BODY MASS INDEX: 37.77 KG/M2 | HEIGHT: 66 IN

## 2024-01-10 PROBLEM — I63.9 CEREBRAL INFARCTION, UNSPECIFIED: Chronic | Status: ACTIVE | Noted: 2023-01-26

## 2024-01-10 PROCEDURE — 99024 POSTOP FOLLOW-UP VISIT: CPT

## 2024-01-10 PROCEDURE — 73562 X-RAY EXAM OF KNEE 3: CPT | Mod: RT

## 2024-01-10 NOTE — ASSESSMENT
[FreeTextEntry1] : 73M 7 days s/p R TKA with Dr. Siddiqui   Continue PT f/u duplex BLE f/u Dr Siddiqui/Eddi as schedule   We discussed the patient's progress and they were reminded of their antibiotic prophylaxis. We discussed continued physical therapy and/or a home exercise program. Questions about their knee replacement and future follow up were answered and discussed.

## 2024-01-10 NOTE — HISTORY OF PRESENT ILLNESS
[8] : 8 [4] : 4 [Dull/Aching] : dull/aching [de-identified] : This is a postop patient of Dr. Siddiqui's who is 7 days s/p R TKA. He had some increased swelling and pain and wanted to be evaluated.

## 2024-01-10 NOTE — PHYSICAL EXAM
[NL (0)] : extension 0 degrees [5___] : hamstring 5[unfilled]/5 [] : uses wheelchair [Right] : right knee [There are no fractures, subluxations or dislocations. No significant abnormalities are seen] : There are no fractures, subluxations or dislocations. No significant abnormalities are seen [No loss of surgical correlation. Bony alignment acceptable. Hardware in appropriate position] : No loss of surgical correlation. Bony alignment acceptable. Hardware in appropriate position [FreeTextEntry3] : dressing with dried sanguinos spots [FreeTextEntry8] : No calf tenderness [TWNoteComboBox7] : flexion 90 degrees

## 2024-01-16 ENCOUNTER — APPOINTMENT (OUTPATIENT)
Dept: ORTHOPEDIC SURGERY | Facility: CLINIC | Age: 74
End: 2024-01-16
Payer: OTHER MISCELLANEOUS

## 2024-01-16 ENCOUNTER — RESULT CHARGE (OUTPATIENT)
Age: 74
End: 2024-01-16

## 2024-01-16 VITALS — HEIGHT: 66 IN | WEIGHT: 235 LBS | BODY MASS INDEX: 37.77 KG/M2

## 2024-01-16 PROCEDURE — 99024 POSTOP FOLLOW-UP VISIT: CPT

## 2024-01-16 PROCEDURE — 73562 X-RAY EXAM OF KNEE 3: CPT | Mod: RT

## 2024-01-16 RX ORDER — OXYCODONE 5 MG/1
5 TABLET ORAL 4 TIMES DAILY
Qty: 30 | Refills: 0 | Status: ACTIVE | COMMUNITY
Start: 2024-01-16 | End: 1900-01-01

## 2024-01-16 NOTE — DISCUSSION/SUMMARY
[de-identified] : The patient is doing well at this time. The patient will be started on a course of physical therapy. I recommended that the patient works on range of motion at home and was shown how to do this. I encouraged the patient to increase ambulation. The patient can continue to take Tylenol for occasional discomfort. The patient was advised not to do any dental work for the first three months following the surgery. We will see the patient  back for a follow-up for a repeat evaluation. The patient  will call or return earlier for any questions or concerns.  Signs and symptoms of infection reviewed and patient advised to call immediately for redness, fevers, and/or chills.

## 2024-01-16 NOTE — ASSESSMENT
[FreeTextEntry1] : Previous doc: Mod/adv OA left knee, prior tib plateau fx. Progression on degen on XR over the psat 3 years. Cortisone inj today and work on weight loss to get BMI < 40. 6/21/19: Continue activities as tolerated and HEP. He has to get vertigo under control. Will consider cortisone at next appt. 9/13/19: Cont pain in left knee - tolerating it with home exercises. Working on weight loss and vertigo control. Uses cane for balance/vertigo. 12/13 knee is stable no sig changes - HEP - uising cane for balance - inj left shoulder today did help him in the past 4/22/20:Steroid inj helped in the past but want to wait until this passes but stable rights now - Vertigo is stable with occ episodes - Left shoulder inj helped a lot rarely has pain 6/2/20: Mult falls secondary to episodes of vertigo. Not much pain today so will hold on injections. Concerned about his ongoing vertigo causing mult falls and leading to further injury - needs to keep following this with PCP although he has been told it will get progressively worse over time and has no good short term solution. Had neg neuro and ENT eval in the past. Left shoulder repeat bursa inj today. - has been falling a lot due to knee and balance - has rib contusion and I feel he will benefit from lidocaine patches 9/4/20: Overall no change - tolerating pain well but has episodes of flareups after falls from vertigo. Discussed inj when pain is severe but for now will cont home exercises. 12/18/20: Stable since last visit. Seems do be doing ok with home exercises so will cont this for now and reeval in 3 months. 8/23/21: Hematoma throughout right calf - no signs of DVT. Recc cont with ice and elevation and restart PT when ab/le to work on quad strength which was likely cause for his knees giving out. 10/1 swelling and ecchimosis resolving but still some knee pain - unchanged and stable - shoulder is stable since the injection - he is currently permanent out of work 7/19/22: 3/29/22: Has falls every so often due to vertigo. Knees have pain when these episodes occur however today he is tolerating the pain well and declined injections. 6/21/22: Discussed TKA, r/b/a, and preop/postop periods in detail. LEft knee worse than the rt knee - planning for TKA in the fall - we will plan robotics get Knee CT scan to eval bone loss 7/19/22: Cont pain, proceed with TKA when authorized. CT was done. 10/25/22: still awaiting WC auth as pain is worsening as well as worsening right knee pain. FU in 4 weeks. plan to speak with  and EC department 11/22/22: Worsening pain in left knee. He is still pending surgical auth. again review surgery and post op coarse 2/28/23: 2 weeks postop doing well, some brusing but normal for course - cont PT, reeval in 4 weeks. 3/28/23: 6 weeks postop doing very well. Cont PT. 4/25/23: Doing very well, cont HEP, return at 1 year postop. 7/25/23: No sign of instability mild effusion but no other sign of infection cute change after traumatic injury most likely a knee sprain. Hinge knee brace, rest ice antiinflam as needed. Re eval in 2 weeks. 8/8/23: Left knee improved since last time. He is concerned about right knee and his balance. XRs today with mild OA - will get MRI right knee eval MMT. 8/18/23: MRI reviewed - has MMT/LMT but areas of singificant OA - offered scope vs TKA and he wishes to proceed with right TKA. Need CT right knee eval bone loss. 9/12/23: Pt with similar symptoms. Awaiting surgery. CT scan with no abnormal findings. Planning for TKA as soon as authorized.  1/16/24: 2 weeks postop doing well, having a lot of pain but good function, no signs of infection.  Cont PT, renewed oxy.

## 2024-01-16 NOTE — HISTORY OF PRESENT ILLNESS
[8] : 8 [4] : 4 [Dull/Aching] : dull/aching [] : Post Surgical Visit: yes [Sharp] : sharp [Throbbing] : throbbing [de-identified] : 1/16/24: 2 weeks s/p right TKA - more difficult than with his left knee.  No fevers/chills.  Had neg doppler.  Previous doc: Left knee injury in 2016 sustained lateral tibial plateau fx treated nonsurgically. Prior to that had arthroscopy 2008. Worsening pain over the past 3 years. PT in the past helped a little but had difficulty with this due to vertigo. Cortisone inj in the past helped. CVA 2017 now takes aspirin - weakness only in right pinky finger. 6/21/19: Symptoms remain the same. Injection helped temporary. Has good and bad days with knee depending on severity of vertigo. 9/13/19: Cont pain in left knee, no change overall. 12/13/19: Cont pain in knees but tolerable. Does home exercises. Left shoulder worsening x 3 months which is part of his WC case. Had inj in Feb which helped for several months - MRI in the past with partial cuff tearing. Pain wakes him up at night. 4/22/20: he has reji doing min due to COVId he is in some pain but tolerable 6/2/20: Mult falls from vertigo recently. Has knee pain only at the time of the fall and then resolves. Min pain today. 9/4/20: Had good relief from shoulder inj last time. Mult falls with vertigo, no change overall. Pain is daily but tolerable at this time. 12/18/20: No significant change since last time. has been doing home exercises which help. 8/23/21: Legs gave out while on the beach, fell and has pain and swelling in right leg. Seen at urgent care last week, had neg XRs. 10/1 overall most of the calf pain as swelling has resolved 3/29/22: Continued pain in b/l knees. Had another fall last month due to vertigo. 6/21/22: He returns with increasing pain in bilateral knees with increasing feeling of instability in his L knee. Pain is now affecting his ADLs 7/19/22: Cont pain, waiting for TKA auth. 10/25/22: continued pain in knees - is waiting for a surgery auth 11/22/22: Worsening pain in knee. He is still pending surgical auth.    2/28/23: 2 weeks s/p left TKA - pain since last night, caught himself from falling during vertigo episode and hit knee against wall.  Prior to this was doing very well min pain. 3/28/23: 6 weeks postop min pain.  PT going well.  No fevers/chills. 4/25/23: 10 weeks postop no pain. 7/25/23:6 months s/p L TKA. Pt was doing well and doing daily activities with min pain . Pt states he was having difficulty balancing and fell 2 days ago twisting left knee- experiencing some anterior and lateral pain.  8/8/23: Min left knee pain but concerned about difficulty with balance.  Unsure if right knee is contributing to this as it gives out sometimes. 9/12/23: Here to review CT scan RT knee [FreeTextEntry5] : swelling down the ankle.  [de-identified] : 01/03/24 [de-identified] : RT TKA CATHERINE

## 2024-01-16 NOTE — PHYSICAL EXAM
[Right] : right knee [AP] : anteroposterior [Lateral] : lateral [[place]] : skyline [Components well fixed, in good position] : Components well fixed, in good position [de-identified] : Right knee: Inc c/d/i.  ROM 5-95.  Lig stable.  NVI.  Walker.

## 2024-01-17 ENCOUNTER — APPOINTMENT (OUTPATIENT)
Dept: PAIN MANAGEMENT | Facility: CLINIC | Age: 74
End: 2024-01-17
Payer: OTHER MISCELLANEOUS

## 2024-01-17 VITALS — BODY MASS INDEX: 37.77 KG/M2 | HEIGHT: 66 IN | WEIGHT: 235 LBS

## 2024-01-17 PROCEDURE — 99214 OFFICE O/P EST MOD 30 MIN: CPT

## 2024-01-17 NOTE — ASSESSMENT
[FreeTextEntry1] : s/p R  TKR 1/3/23, still having alot of pain in knee, saw surgeon office yesterday; using walker for knee pain    c/o pain in back

## 2024-01-17 NOTE — HISTORY OF PRESENT ILLNESS
[Neck] : neck [Work related] : work related [8] : 8 [7] : 7 [Dull/Aching] : dull/aching [Sharp] : sharp [Shooting] : shooting [Frequent] : frequent [Household chores] : household chores [Social interactions] : social interactions [Rest] : rest [Meds] : meds [Walking] : walking [Bending forward] : bending forward [Exercising] : exercising [Stairs] : stairs [] : yes [FreeTextEntry1] : shoulders, knees [FreeTextEntry3] : 01/16/2001 [FreeTextEntry7] : left ankle/knees [de-identified] : lifting

## 2024-02-06 ENCOUNTER — RX RENEWAL (OUTPATIENT)
Age: 74
End: 2024-02-06

## 2024-02-07 ENCOUNTER — APPOINTMENT (OUTPATIENT)
Dept: ORTHOPEDIC SURGERY | Facility: CLINIC | Age: 74
End: 2024-02-07
Payer: OTHER MISCELLANEOUS

## 2024-02-07 VITALS — HEIGHT: 66 IN | BODY MASS INDEX: 37.77 KG/M2 | WEIGHT: 235 LBS

## 2024-02-07 DIAGNOSIS — Z96.651 PRESENCE OF RIGHT ARTIFICIAL KNEE JOINT: ICD-10-CM

## 2024-02-07 DIAGNOSIS — Z00.00 ENCOUNTER FOR GENERAL ADULT MEDICAL EXAMINATION W/OUT ABNORMAL FINDINGS: ICD-10-CM

## 2024-02-07 PROCEDURE — 73562 X-RAY EXAM OF KNEE 3: CPT | Mod: RT

## 2024-02-07 PROCEDURE — 99024 POSTOP FOLLOW-UP VISIT: CPT

## 2024-02-07 NOTE — IMAGING
[Left] : left knee [AP] : anteroposterior [Lateral] : lateral [[place]] : skyline [There are no fractures, subluxations or dislocations. No significant abnormalities are seen] : There are no fractures, subluxations or dislocations. No significant abnormalities are seen [No loss of surgical correlation. Bony alignment acceptable. Hardware in appropriate position] : No loss of surgical correlation. Bony alignment acceptable. Hardware in appropriate position [Components well fixed, in good position] : Components well fixed, in good position [FreeTextEntry9] : no patella tiara

## 2024-02-07 NOTE — PHYSICAL EXAM
[Right] : right knee [NL (0)] : extension 0 degrees [5___] : hamstring 5[unfilled]/5 [] : uses walker [FreeTextEntry3] : no gap of quad tendon  [TWNoteComboBox7] : flexion 90 degrees

## 2024-02-07 NOTE — HISTORY OF PRESENT ILLNESS
[10] : 10 [9] : 9 [de-identified] : 2/07/24 - 74 yo m here for eval of rt knee  (post op right total knee from 1/4/24)  pt states he was getting up from a mattress on a floor, had trip and fall , and injured rt knee  today  pt states he has hx vertigo reports 3 falls per week  ambulating with a walker  [FreeTextEntry1] : rt knee

## 2024-02-07 NOTE — ASSESSMENT
[FreeTextEntry1] : contusion s/p fall continue with rom and therapy and ambulation with walker f/u next week with Dr Siddiqui

## 2024-02-16 ENCOUNTER — APPOINTMENT (OUTPATIENT)
Dept: ORTHOPEDIC SURGERY | Facility: CLINIC | Age: 74
End: 2024-02-16
Payer: OTHER MISCELLANEOUS

## 2024-02-16 PROCEDURE — 99024 POSTOP FOLLOW-UP VISIT: CPT

## 2024-02-16 PROCEDURE — 73562 X-RAY EXAM OF KNEE 3: CPT | Mod: RT

## 2024-02-16 NOTE — DISCUSSION/SUMMARY
[de-identified] : We discussed the patient's progress and they were reminded of their antibiotic prophylaxis.  We discussed continued physical therapy and/or a home exercise program.  Questions about their knee replacement and future follow up were answered and discussed.The incision was inspected and was clean and dry with no drainage.  The patient was instructed to call for fevers, chills, wound drainage, wound opening, redness, or any other concerns.  Entered by Fern Lucio acting as a scribe.

## 2024-02-16 NOTE — ASSESSMENT
[FreeTextEntry1] : Previous doc: Mod/adv OA left knee, prior tib plateau fx. Progression on degen on XR over the psat 3 years. Cortisone inj today and work on weight loss to get BMI < 40. 6/21/19: Continue activities as tolerated and HEP. He has to get vertigo under control. Will consider cortisone at next appt. 9/13/19: Cont pain in left knee - tolerating it with home exercises. Working on weight loss and vertigo control. Uses cane for balance/vertigo. 12/13 knee is stable no sig changes - HEP - uising cane for balance - inj left shoulder today did help him in the past 4/22/20:Steroid inj helped in the past but want to wait until this passes but stable rights now - Vertigo is stable with occ episodes - Left shoulder inj helped a lot rarely has pain 6/2/20: Mult falls secondary to episodes of vertigo. Not much pain today so will hold on injections. Concerned about his ongoing vertigo causing mult falls and leading to further injury - needs to keep following this with PCP although he has been told it will get progressively worse over time and has no good short term solution. Had neg neuro and ENT eval in the past. Left shoulder repeat bursa inj today. - has been falling a lot due to knee and balance - has rib contusion and I feel he will benefit from lidocaine patches 9/4/20: Overall no change - tolerating pain well but has episodes of flareups after falls from vertigo. Discussed inj when pain is severe but for now will cont home exercises. 12/18/20: Stable since last visit. Seems do be doing ok with home exercises so will cont this for now and reeval in 3 months. 8/23/21: Hematoma throughout right calf - no signs of DVT. Recc cont with ice and elevation and restart PT when ab/le to work on quad strength which was likely cause for his knees giving out. 10/1 swelling and ecchimosis resolving but still some knee pain - unchanged and stable - shoulder is stable since the injection - he is currently permanent out of work 7/19/22: 3/29/22: Has falls every so often due to vertigo. Knees have pain when these episodes occur however today he is tolerating the pain well and declined injections. 6/21/22: Discussed TKA, r/b/a, and preop/postop periods in detail. LEft knee worse than the rt knee - planning for TKA in the fall - we will plan robotics get Knee CT scan to eval bone loss 7/19/22: Cont pain, proceed with TKA when authorized. CT was done. 10/25/22: still awaiting WC auth as pain is worsening as well as worsening right knee pain. FU in 4 weeks. plan to speak with  and EC department 11/22/22: Worsening pain in left knee. He is still pending surgical auth. again review surgery and post op coarse 2/28/23: 2 weeks postop doing well, some brusing but normal for course - cont PT, reeval in 4 weeks. 3/28/23: 6 weeks postop doing very well. Cont PT. 4/25/23: Doing very well, cont HEP, return at 1 year postop. 7/25/23: No sign of instability mild effusion but no other sign of infection cute change after traumatic injury most likely a knee sprain. Hinge knee brace, rest ice antiinflam as needed. Re eval in 2 weeks. 8/8/23: Left knee improved since last time. He is concerned about right knee and his balance. XRs today with mild OA - will get MRI right knee eval MMT. 8/18/23: MRI reviewed - has MMT/LMT but areas of singificant OA - offered scope vs TKA and he wishes to proceed with right TKA. Need CT right knee eval bone loss. 9/12/23: Pt with similar symptoms. Awaiting surgery. CT scan with no abnormal findings. Planning for TKA as soon as authorized. 1/16/24: 2 weeks postop doing well, having a lot of pain but good function, no signs of infection.  Cont PT, renewed oxy.  2/16/24: 6 weeks s/p R TKA. XR look good. Discussed pain is normal for the course, overall he is feeling better than prior to surgery and we are both happy with his progression thus far. Continue with PT. Follow up 1 month.

## 2024-02-16 NOTE — HISTORY OF PRESENT ILLNESS
[Dull/Aching] : dull/aching [] : Post Surgical Visit: yes [de-identified] : 2/16/24: PO #2. 6 weeks s/p R TKA. Currently doing PT- feels it is beneficial, making good progress with ROM. Pain is improving. Taking tylenol as needed for pain with good relief. Ambulates without assistance.   Previous doc: Left knee injury in 2016 sustained lateral tibial plateau fx treated nonsurgically. Prior to that had arthroscopy 2008. Worsening pain over the past 3 years. PT in the past helped a little but had difficulty with this due to vertigo. Cortisone inj in the past helped. CVA 2017 now takes aspirin - weakness only in right pinky finger. 6/21/19: Symptoms remain the same. Injection helped temporary. Has good and bad days with knee depending on severity of vertigo. 9/13/19: Cont pain in left knee, no change overall. 12/13/19: Cont pain in knees but tolerable. Does home exercises. Left shoulder worsening x 3 months which is part of his WC case. Had inj in Feb which helped for several months - MRI in the past with partial cuff tearing. Pain wakes him up at night. 4/22/20: he has reji doing min due to COVId he is in some pain but tolerable 6/2/20: Mult falls from vertigo recently. Has knee pain only at the time of the fall and then resolves. Min pain today. 9/4/20: Had good relief from shoulder inj last time. Mult falls with vertigo, no change overall. Pain is daily but tolerable at this time. 12/18/20: No significant change since last time. has been doing home exercises which help. 8/23/21: Legs gave out while on the beach, fell and has pain and swelling in right leg. Seen at urgent care last week, had neg XRs. 10/1 overall most of the calf pain as swelling has resolved 3/29/22: Continued pain in b/l knees. Had another fall last month due to vertigo. 6/21/22: He returns with increasing pain in bilateral knees with increasing feeling of instability in his L knee. Pain is now affecting his ADLs 7/19/22: Cont pain, waiting for TKA auth. 10/25/22: continued pain in knees - is waiting for a surgery auth 11/22/22: Worsening pain in knee. He is still pending surgical auth.    2/28/23: 2 weeks s/p left TKA - pain since last night, caught himself from falling during vertigo episode and hit knee against wall.  Prior to this was doing very well min pain. 3/28/23: 6 weeks postop min pain.  PT going well.  No fevers/chills. 4/25/23: 10 weeks postop no pain. 7/25/23:6 months s/p L TKA. Pt was doing well and doing daily activities with min pain . Pt states he was having difficulty balancing and fell 2 days ago twisting left knee- experiencing some anterior and lateral pain.  8/8/23: Min left knee pain but concerned about difficulty with balance.  Unsure if right knee is contributing to this as it gives out sometimes. 9/12/23: Here to review CT scan RT knee 1/16/24: PO #1. 2 weeks s/p right TKA - more difficult than with his left knee.  No fevers/chills.  Had neg doppler. [de-identified] : PT

## 2024-03-15 ENCOUNTER — APPOINTMENT (OUTPATIENT)
Dept: ORTHOPEDIC SURGERY | Facility: CLINIC | Age: 74
End: 2024-03-15
Payer: OTHER MISCELLANEOUS

## 2024-03-15 VITALS — BODY MASS INDEX: 37.77 KG/M2 | HEIGHT: 66 IN | WEIGHT: 235 LBS

## 2024-03-15 DIAGNOSIS — Z96.651 PRESENCE OF RIGHT ARTIFICIAL KNEE JOINT: ICD-10-CM

## 2024-03-15 PROCEDURE — 99024 POSTOP FOLLOW-UP VISIT: CPT

## 2024-03-15 NOTE — ASSESSMENT
[FreeTextEntry1] : Previous doc: Mod/adv OA left knee, prior tib plateau fx. Progression on degen on XR over the psat 3 years. Cortisone inj today and work on weight loss to get BMI < 40. 6/21/19: Continue activities as tolerated and HEP. He has to get vertigo under control. Will consider cortisone at next appt. 9/13/19: Cont pain in left knee - tolerating it with home exercises. Working on weight loss and vertigo control. Uses cane for balance/vertigo. 12/13 knee is stable no sig changes - HEP - uising cane for balance - inj left shoulder today did help him in the past 4/22/20:Steroid inj helped in the past but want to wait until this passes but stable rights now - Vertigo is stable with occ episodes - Left shoulder inj helped a lot rarely has pain 6/2/20: Mult falls secondary to episodes of vertigo. Not much pain today so will hold on injections. Concerned about his ongoing vertigo causing mult falls and leading to further injury - needs to keep following this with PCP although he has been told it will get progressively worse over time and has no good short term solution. Had neg neuro and ENT eval in the past. Left shoulder repeat bursa inj today. - has been falling a lot due to knee and balance - has rib contusion and I feel he will benefit from lidocaine patches 9/4/20: Overall no change - tolerating pain well but has episodes of flareups after falls from vertigo. Discussed inj when pain is severe but for now will cont home exercises. 12/18/20: Stable since last visit. Seems do be doing ok with home exercises so will cont this for now and reeval in 3 months. 8/23/21: Hematoma throughout right calf - no signs of DVT. Recc cont with ice and elevation and restart PT when ab/le to work on quad strength which was likely cause for his knees giving out. 10/1 swelling and ecchimosis resolving but still some knee pain - unchanged and stable - shoulder is stable since the injection - he is currently permanent out of work 7/19/22: 3/29/22: Has falls every so often due to vertigo. Knees have pain when these episodes occur however today he is tolerating the pain well and declined injections. 6/21/22: Discussed TKA, r/b/a, and preop/postop periods in detail. LEft knee worse than the rt knee - planning for TKA in the fall - we will plan robotics get Knee CT scan to eval bone loss 7/19/22: Cont pain, proceed with TKA when authorized. CT was done. 10/25/22: still awaiting WC auth as pain is worsening as well as worsening right knee pain. FU in 4 weeks. plan to speak with  and EC department 11/22/22: Worsening pain in left knee. He is still pending surgical auth. again review surgery and post op coarse 2/28/23: 2 weeks postop doing well, some brusing but normal for course - cont PT, reeval in 4 weeks. 3/28/23: 6 weeks postop doing very well. Cont PT. 4/25/23: Doing very well, cont HEP, return at 1 year postop. 7/25/23: No sign of instability mild effusion but no other sign of infection cute change after traumatic injury most likely a knee sprain. Hinge knee brace, rest ice antiinflam as needed. Re eval in 2 weeks. 8/8/23: Left knee improved since last time. He is concerned about right knee and his balance. XRs today with mild OA - will get MRI right knee eval MMT. 8/18/23: MRI reviewed - has MMT/LMT but areas of singificant OA - offered scope vs TKA and he wishes to proceed with right TKA. Need CT right knee eval bone loss. 9/12/23: Pt with similar symptoms. Awaiting surgery. CT scan with no abnormal findings. Planning for TKA as soon as authorized. 1/16/24: 2 weeks postop doing well, having a lot of pain but good function, no signs of infection.  Cont PT, renewed oxy. 2/16/24: 6 weeks s/p R TKA. XR look good. Discussed pain is normal for the course, overall he is feeling better than prior to surgery and we are both happy with his progression thus far. Continue with PT. Follow up 1 month.   3/15/24: 2.5 months s/p R TKA. Doing well. He has been progressing well. Continue with PT and transition to HEP. f/up at 1 year anniversary and repeat XR.

## 2024-03-15 NOTE — DISCUSSION/SUMMARY
[de-identified] : The patient was advised of the diagnosis.  The natural history of the pathology was explained in full to the patient in layman's terms. All questions were answered.  The risks and benefits of surgical and non-surgical treatment alternatives were explained in full to the patient.  Progress note completed by Shagufta Jarrett PA-C.

## 2024-03-15 NOTE — OCCUPATIONAL THERAPY INITIAL EVALUATION ADULT - GENERAL OBSERVATIONS, REHAB EVAL
Catheter inserted. Pt was seen for initial OT consult, encountered OOB to chair in NAD.  IV heplock and left knee are in place. Grade 1+ edema is noted in LLE with stiffness and decreased ROM. Pt was AA&Ox4, cooperative & followed commands. Pt c/o left knee pain due to s/p TKR; this limits pt's activity tolerance ,balance, ADL management and functional mobility.

## 2024-03-15 NOTE — HISTORY OF PRESENT ILLNESS
[2] : 2 [] : Post Surgical Visit: yes [de-identified] : 3/15/24: 2.5 months s/p R TKA. He has muscle soreness, but overall he is doing well.   Previous doc: Left knee injury in 2016 sustained lateral tibial plateau fx treated nonsurgically. Prior to that had arthroscopy 2008. Worsening pain over the past 3 years. PT in the past helped a little but had difficulty with this due to vertigo. Cortisone inj in the past helped. CVA 2017 now takes aspirin - weakness only in right pinky finger. 6/21/19: Symptoms remain the same. Injection helped temporary. Has good and bad days with knee depending on severity of vertigo. 9/13/19: Cont pain in left knee, no change overall. 12/13/19: Cont pain in knees but tolerable. Does home exercises. Left shoulder worsening x 3 months which is part of his WC case. Had inj in Feb which helped for several months - MRI in the past with partial cuff tearing. Pain wakes him up at night. 4/22/20: he has reji doing min due to COVId he is in some pain but tolerable 6/2/20: Mult falls from vertigo recently. Has knee pain only at the time of the fall and then resolves. Min pain today. 9/4/20: Had good relief from shoulder inj last time. Mult falls with vertigo, no change overall. Pain is daily but tolerable at this time. 12/18/20: No significant change since last time. has been doing home exercises which help. 8/23/21: Legs gave out while on the beach, fell and has pain and swelling in right leg. Seen at urgent care last week, had neg XRs. 10/1 overall most of the calf pain as swelling has resolved 3/29/22: Continued pain in b/l knees. Had another fall last month due to vertigo. 6/21/22: He returns with increasing pain in bilateral knees with increasing feeling of instability in his L knee. Pain is now affecting his ADLs 7/19/22: Cont pain, waiting for TKA auth. 10/25/22: continued pain in knees - is waiting for a surgery auth 11/22/22: Worsening pain in knee. He is still pending surgical auth.    2/28/23: 2 weeks s/p left TKA - pain since last night, caught himself from falling during vertigo episode and hit knee against wall.  Prior to this was doing very well min pain. 3/28/23: 6 weeks postop min pain.  PT going well.  No fevers/chills. 4/25/23: 10 weeks postop no pain. 7/25/23:6 months s/p L TKA. Pt was doing well and doing daily activities with min pain . Pt states he was having difficulty balancing and fell 2 days ago twisting left knee- experiencing some anterior and lateral pain.  8/8/23: Min left knee pain but concerned about difficulty with balance.  Unsure if right knee is contributing to this as it gives out sometimes. 9/12/23: Here to review CT scan RT knee 1/16/24: PO #1. 2 weeks s/p right TKA - more difficult than with his left knee.  No fevers/chills.  Had neg doppler. 2/16/24: PO #2. 6 weeks s/p R TKA. Currently doing PT- feels it is beneficial, making good progress with ROM. Pain is improving. Taking tylenol as needed for pain with good relief. Ambulates without assistance.  [de-identified] : pt  [de-identified] : 01/03/24 [de-identified] : RT TKA CATHERINE

## 2024-04-17 ENCOUNTER — APPOINTMENT (OUTPATIENT)
Dept: PAIN MANAGEMENT | Facility: CLINIC | Age: 74
End: 2024-04-17
Payer: OTHER MISCELLANEOUS

## 2024-04-17 VITALS — WEIGHT: 235 LBS | BODY MASS INDEX: 37.77 KG/M2 | HEIGHT: 66 IN

## 2024-04-17 DIAGNOSIS — M54.50 LOW BACK PAIN, UNSPECIFIED: ICD-10-CM

## 2024-04-17 DIAGNOSIS — G89.4 CHRONIC PAIN SYNDROME: ICD-10-CM

## 2024-04-17 PROCEDURE — 99213 OFFICE O/P EST LOW 20 MIN: CPT

## 2024-04-17 NOTE — HISTORY OF PRESENT ILLNESS
[Neck] : neck [Work related] : work related [8] : 8 [7] : 7 [Dull/Aching] : dull/aching [Sharp] : sharp [Shooting] : shooting [Frequent] : frequent [Household chores] : household chores [Social interactions] : social interactions [Rest] : rest [Meds] : meds [Walking] : walking [Bending forward] : bending forward [Exercising] : exercising [Stairs] : stairs [] : yes [4] : 4 [FreeTextEntry1] : shoulders, knees [FreeTextEntry3] : 01/16/2001 [FreeTextEntry7] : left ankle/knees [de-identified] : lifting

## 2024-05-15 ENCOUNTER — RX RENEWAL (OUTPATIENT)
Age: 74
End: 2024-05-15

## 2024-06-21 ENCOUNTER — RX RENEWAL (OUTPATIENT)
Age: 74
End: 2024-06-21

## 2024-06-21 RX ORDER — CYCLOBENZAPRINE HYDROCHLORIDE 10 MG/1
10 TABLET, FILM COATED ORAL
Qty: 90 | Refills: 0 | Status: ACTIVE | COMMUNITY
Start: 2022-04-15 | End: 1900-01-01

## 2024-06-21 RX ORDER — GABAPENTIN 300 MG/1
300 CAPSULE ORAL
Qty: 180 | Refills: 0 | Status: ACTIVE | COMMUNITY
Start: 2022-04-15 | End: 1900-01-01

## 2024-07-17 ENCOUNTER — APPOINTMENT (OUTPATIENT)
Dept: PAIN MANAGEMENT | Facility: CLINIC | Age: 74
End: 2024-07-17

## 2024-07-19 ENCOUNTER — APPOINTMENT (OUTPATIENT)
Dept: PAIN MANAGEMENT | Facility: CLINIC | Age: 74
End: 2024-07-19
Payer: OTHER MISCELLANEOUS

## 2024-07-19 VITALS — WEIGHT: 235 LBS | HEIGHT: 66 IN | BODY MASS INDEX: 37.77 KG/M2

## 2024-07-19 DIAGNOSIS — G89.4 CHRONIC PAIN SYNDROME: ICD-10-CM

## 2024-07-19 DIAGNOSIS — M54.50 LOW BACK PAIN, UNSPECIFIED: ICD-10-CM

## 2024-07-19 PROCEDURE — 99213 OFFICE O/P EST LOW 20 MIN: CPT

## 2024-07-29 ENCOUNTER — RX RENEWAL (OUTPATIENT)
Age: 74
End: 2024-07-29

## 2024-11-01 ENCOUNTER — RX RENEWAL (OUTPATIENT)
Age: 74
End: 2024-11-01

## 2024-11-27 ENCOUNTER — APPOINTMENT (OUTPATIENT)
Dept: PAIN MANAGEMENT | Facility: CLINIC | Age: 74
End: 2024-11-27
Payer: OTHER MISCELLANEOUS

## 2024-11-27 VITALS — HEIGHT: 66 IN | BODY MASS INDEX: 37.77 KG/M2 | WEIGHT: 235 LBS

## 2024-11-27 DIAGNOSIS — G89.4 CHRONIC PAIN SYNDROME: ICD-10-CM

## 2024-11-27 DIAGNOSIS — M54.50 LOW BACK PAIN, UNSPECIFIED: ICD-10-CM

## 2024-11-27 PROCEDURE — 99213 OFFICE O/P EST LOW 20 MIN: CPT

## 2025-01-30 ENCOUNTER — APPOINTMENT (OUTPATIENT)
Dept: PAIN MANAGEMENT | Facility: CLINIC | Age: 75
End: 2025-01-30
Payer: OTHER MISCELLANEOUS

## 2025-01-30 VITALS — BODY MASS INDEX: 37.77 KG/M2 | WEIGHT: 235 LBS | HEIGHT: 66 IN

## 2025-01-30 DIAGNOSIS — M54.50 LOW BACK PAIN, UNSPECIFIED: ICD-10-CM

## 2025-01-30 PROCEDURE — 99213 OFFICE O/P EST LOW 20 MIN: CPT

## 2025-04-03 NOTE — PHYSICAL THERAPY INITIAL EVALUATION ADULT - STANDING BALANCE: STATIC
I referred the patient to primary care physician for treatment for chronic fatigue.  Thank you   with rolling walker/good balance

## 2025-04-24 ENCOUNTER — APPOINTMENT (OUTPATIENT)
Dept: PAIN MANAGEMENT | Facility: CLINIC | Age: 75
End: 2025-04-24
Payer: OTHER MISCELLANEOUS

## 2025-04-24 VITALS — WEIGHT: 249 LBS | BODY MASS INDEX: 40.02 KG/M2 | HEIGHT: 66 IN

## 2025-04-24 DIAGNOSIS — M54.50 LOW BACK PAIN, UNSPECIFIED: ICD-10-CM

## 2025-04-24 DIAGNOSIS — G89.4 CHRONIC PAIN SYNDROME: ICD-10-CM

## 2025-04-24 PROCEDURE — 99213 OFFICE O/P EST LOW 20 MIN: CPT

## 2025-06-19 ENCOUNTER — RX RENEWAL (OUTPATIENT)
Age: 75
End: 2025-06-19

## 2025-07-24 ENCOUNTER — APPOINTMENT (OUTPATIENT)
Dept: PAIN MANAGEMENT | Facility: CLINIC | Age: 75
End: 2025-07-24

## 2025-08-01 ENCOUNTER — APPOINTMENT (OUTPATIENT)
Dept: PAIN MANAGEMENT | Facility: CLINIC | Age: 75
End: 2025-08-01
Payer: OTHER MISCELLANEOUS

## 2025-08-01 DIAGNOSIS — G89.4 CHRONIC PAIN SYNDROME: ICD-10-CM

## 2025-08-01 DIAGNOSIS — M54.50 LOW BACK PAIN, UNSPECIFIED: ICD-10-CM

## 2025-08-01 PROCEDURE — 99213 OFFICE O/P EST LOW 20 MIN: CPT

## (undated) DEVICE — NDL HYPO SAFE 22G X 1.5" (BLACK)

## (undated) DEVICE — SUT STRATAFIX SPIRAL PDS PLUS 2-0 45CM CT-1

## (undated) DEVICE — CRYO/CUFF GRAVITY COOLER KNEE LARGE

## (undated) DEVICE — HOOD T5 PEELAWAY

## (undated) DEVICE — SAW BLADE STRYKER SAGITTAL 25X0.89X75MM

## (undated) DEVICE — DRAPE SURGICAL #1010

## (undated) DEVICE — MAKO VIZADISC KNEE TRACKING KIT

## (undated) DEVICE — MAKO CHECKPOINT KIT FEMORAL / TIBIAL

## (undated) DEVICE — VENODYNE/SCD SLEEVE CALF MEDIUM

## (undated) DEVICE — MEDICATION LABELS W MARKER

## (undated) DEVICE — SOL IRR BAG NS 0.9% 3000ML

## (undated) DEVICE — DRSG COBAN 6"

## (undated) DEVICE — DRSG TELFA 3 X 8

## (undated) DEVICE — TOURNIQUET ESMARK 6"

## (undated) DEVICE — WARMING BLANKET UPPER ADULT

## (undated) DEVICE — MIXER BONE CEMENT EVAC III

## (undated) DEVICE — SUT STRATAFIX SYMMETRIC PDS PLUS 1 18" CTX VIOLET

## (undated) DEVICE — DRAPE STERI-DRAPE INCISE 19X17"

## (undated) DEVICE — GLV 8.5 PROTEXIS (WHITE)

## (undated) DEVICE — SYR LUER LOK 20CC

## (undated) DEVICE — SUCTION TIP KAMVAC MINI

## (undated) DEVICE — PREP SCRUB BRUSH W CHG 4%

## (undated) DEVICE — PACK TOTAL KNEE

## (undated) DEVICE — ZIMMER PULSAVAC PLUS FAN KIT

## (undated) DEVICE — FRA-ESU BOVIE FORCE TRIAD T7J19731DX: Type: DURABLE MEDICAL EQUIPMENT

## (undated) DEVICE — POSITIONER FOAM BUMP FLAT TOP 10X6X4" LRG

## (undated) DEVICE — FRA-ESU BOVIE FORCE TRIAD T6D04548DX: Type: DURABLE MEDICAL EQUIPMENT

## (undated) DEVICE — MAKO BLADE STANDARD

## (undated) DEVICE — SUT STRATAFIX SPIRAL MONOCRYL PLUS 4-0 45CM PS-2 UNDYED

## (undated) DEVICE — DRSG DERMABOND PRINEO 22CM

## (undated) DEVICE — MAKO BLADE NARROW

## (undated) DEVICE — MAKO DRAPE KIT

## (undated) DEVICE — DRSG PICO NPWT 4X12"

## (undated) DEVICE — SUT VICRYL 0 27" CT-1 UNDYED

## (undated) DEVICE — DRSG ACE BANDAGE 6"

## (undated) DEVICE — SYR ASEPTO